# Patient Record
Sex: MALE | Race: WHITE | Employment: FULL TIME | URBAN - METROPOLITAN AREA
[De-identification: names, ages, dates, MRNs, and addresses within clinical notes are randomized per-mention and may not be internally consistent; named-entity substitution may affect disease eponyms.]

---

## 2017-01-26 PROBLEM — G89.29 CHRONIC LEFT-SIDED LOW BACK PAIN WITHOUT SCIATICA: Status: ACTIVE | Noted: 2017-01-26

## 2017-01-26 PROBLEM — M54.50 CHRONIC LEFT-SIDED LOW BACK PAIN WITHOUT SCIATICA: Status: ACTIVE | Noted: 2017-01-26

## 2017-01-26 PROCEDURE — 80048 BASIC METABOLIC PNL TOTAL CA: CPT | Performed by: INTERNAL MEDICINE

## 2017-01-26 PROCEDURE — 83540 ASSAY OF IRON: CPT | Performed by: INTERNAL MEDICINE

## 2017-01-26 PROCEDURE — 84443 ASSAY THYROID STIM HORMONE: CPT | Performed by: INTERNAL MEDICINE

## 2017-01-26 PROCEDURE — 82306 VITAMIN D 25 HYDROXY: CPT | Performed by: INTERNAL MEDICINE

## 2017-01-26 PROCEDURE — 83550 IRON BINDING TEST: CPT | Performed by: INTERNAL MEDICINE

## 2017-01-26 PROCEDURE — 83735 ASSAY OF MAGNESIUM: CPT | Performed by: INTERNAL MEDICINE

## 2017-01-26 PROCEDURE — 36415 COLL VENOUS BLD VENIPUNCTURE: CPT | Performed by: INTERNAL MEDICINE

## 2017-01-26 PROCEDURE — 82728 ASSAY OF FERRITIN: CPT | Performed by: INTERNAL MEDICINE

## 2017-01-26 PROCEDURE — 82607 VITAMIN B-12: CPT | Performed by: INTERNAL MEDICINE

## 2017-03-08 PROBLEM — Z86.711 HISTORY OF PULMONARY EMBOLISM: Status: ACTIVE | Noted: 2017-03-08

## 2017-04-27 PROBLEM — L30.9 ECZEMA OF BOTH HANDS: Status: ACTIVE | Noted: 2017-04-27

## 2017-12-19 ENCOUNTER — HOSPITAL ENCOUNTER (INPATIENT)
Facility: HOSPITAL | Age: 62
LOS: 2 days | Discharge: NURSING, REHAB OR LONG-TERM CARE | DRG: 065 | End: 2017-12-22
Attending: EMERGENCY MEDICINE | Admitting: INTERNAL MEDICINE
Payer: COMMERCIAL

## 2017-12-19 ENCOUNTER — APPOINTMENT (OUTPATIENT)
Dept: CT IMAGING | Facility: HOSPITAL | Age: 62
DRG: 065 | End: 2017-12-19
Payer: COMMERCIAL

## 2017-12-19 DIAGNOSIS — R53.1 WEAKNESS GENERALIZED: Primary | ICD-10-CM

## 2017-12-19 DIAGNOSIS — F41.9 ANXIETY: ICD-10-CM

## 2017-12-19 DIAGNOSIS — R53.1 RIGHT SIDED WEAKNESS: ICD-10-CM

## 2017-12-19 DIAGNOSIS — I48.0 PAF (PAROXYSMAL ATRIAL FIBRILLATION) (HCC): ICD-10-CM

## 2017-12-19 DIAGNOSIS — R42 DIZZINESS: ICD-10-CM

## 2017-12-19 PROCEDURE — 70450 CT HEAD/BRAIN W/O DYE: CPT | Performed by: EMERGENCY MEDICINE

## 2017-12-20 ENCOUNTER — APPOINTMENT (OUTPATIENT)
Dept: MRI IMAGING | Facility: HOSPITAL | Age: 62
DRG: 065 | End: 2017-12-20
Attending: INTERNAL MEDICINE
Payer: COMMERCIAL

## 2017-12-20 ENCOUNTER — APPOINTMENT (OUTPATIENT)
Dept: ULTRASOUND IMAGING | Facility: HOSPITAL | Age: 62
DRG: 065 | End: 2017-12-20
Attending: INTERNAL MEDICINE
Payer: COMMERCIAL

## 2017-12-20 ENCOUNTER — APPOINTMENT (OUTPATIENT)
Dept: CV DIAGNOSTICS | Facility: HOSPITAL | Age: 62
DRG: 065 | End: 2017-12-20
Attending: INTERNAL MEDICINE
Payer: COMMERCIAL

## 2017-12-20 PROBLEM — R53.1 WEAKNESS GENERALIZED: Status: ACTIVE | Noted: 2017-12-20

## 2017-12-20 PROBLEM — I48.0 PAF (PAROXYSMAL ATRIAL FIBRILLATION) (HCC): Status: ACTIVE | Noted: 2017-12-20

## 2017-12-20 PROBLEM — I48.0 PAF (PAROXYSMAL ATRIAL FIBRILLATION) (HCC): Status: RESOLVED | Noted: 2017-12-20 | Resolved: 2017-12-20

## 2017-12-20 PROBLEM — R42 DIZZINESS: Status: ACTIVE | Noted: 2017-12-20

## 2017-12-20 PROBLEM — F41.9 ANXIETY: Status: ACTIVE | Noted: 2017-12-20

## 2017-12-20 PROBLEM — R53.1 RIGHT SIDED WEAKNESS: Status: ACTIVE | Noted: 2017-12-20

## 2017-12-20 PROCEDURE — 93306 TTE W/DOPPLER COMPLETE: CPT | Performed by: INTERNAL MEDICINE

## 2017-12-20 PROCEDURE — 93880 EXTRACRANIAL BILAT STUDY: CPT | Performed by: INTERNAL MEDICINE

## 2017-12-20 PROCEDURE — 70544 MR ANGIOGRAPHY HEAD W/O DYE: CPT | Performed by: INTERNAL MEDICINE

## 2017-12-20 PROCEDURE — 70551 MRI BRAIN STEM W/O DYE: CPT | Performed by: INTERNAL MEDICINE

## 2017-12-20 PROCEDURE — 99255 IP/OBS CONSLTJ NEW/EST HI 80: CPT | Performed by: OTHER

## 2017-12-20 RX ORDER — DIPHENHYDRAMINE HYDROCHLORIDE 50 MG/ML
25 INJECTION INTRAMUSCULAR; INTRAVENOUS ONCE
Status: COMPLETED | OUTPATIENT
Start: 2017-12-20 | End: 2017-12-20

## 2017-12-20 RX ORDER — LOSARTAN POTASSIUM 100 MG/1
100 TABLET ORAL DAILY
Status: DISCONTINUED | OUTPATIENT
Start: 2017-12-20 | End: 2017-12-22

## 2017-12-20 RX ORDER — ATORVASTATIN CALCIUM 40 MG/1
80 TABLET, FILM COATED ORAL NIGHTLY
Status: DISCONTINUED | OUTPATIENT
Start: 2017-12-20 | End: 2017-12-22

## 2017-12-20 RX ORDER — ASPIRIN 325 MG
325 TABLET ORAL DAILY
Status: DISCONTINUED | OUTPATIENT
Start: 2017-12-20 | End: 2017-12-20

## 2017-12-20 RX ORDER — POTASSIUM CHLORIDE 20 MEQ/1
40 TABLET, EXTENDED RELEASE ORAL EVERY 4 HOURS
Status: COMPLETED | OUTPATIENT
Start: 2017-12-20 | End: 2017-12-20

## 2017-12-20 RX ORDER — OMEGA-3 FATTY ACIDS/FISH OIL 300-1000MG
600 CAPSULE ORAL EVERY 8 HOURS PRN
COMMUNITY
End: 2017-12-22

## 2017-12-20 RX ORDER — ATORVASTATIN CALCIUM 40 MG/1
80 TABLET, FILM COATED ORAL NIGHTLY
Status: DISCONTINUED | OUTPATIENT
Start: 2017-12-20 | End: 2017-12-20

## 2017-12-20 RX ORDER — CLOPIDOGREL BISULFATE 75 MG/1
75 TABLET ORAL DAILY
Status: DISCONTINUED | OUTPATIENT
Start: 2017-12-20 | End: 2017-12-22

## 2017-12-20 RX ORDER — TRAMADOL HYDROCHLORIDE 50 MG/1
50 TABLET ORAL EVERY 6 HOURS PRN
Status: DISCONTINUED | OUTPATIENT
Start: 2017-12-20 | End: 2017-12-22

## 2017-12-20 RX ORDER — ACETAMINOPHEN 325 MG/1
650 TABLET ORAL EVERY 6 HOURS PRN
Status: DISCONTINUED | OUTPATIENT
Start: 2017-12-20 | End: 2017-12-22

## 2017-12-20 RX ORDER — DEXTROSE MONOHYDRATE 25 G/50ML
50 INJECTION, SOLUTION INTRAVENOUS AS NEEDED
Status: DISCONTINUED | OUTPATIENT
Start: 2017-12-20 | End: 2017-12-22

## 2017-12-20 RX ORDER — LORAZEPAM 2 MG/ML
0.5 INJECTION INTRAMUSCULAR ONCE
Status: COMPLETED | OUTPATIENT
Start: 2017-12-20 | End: 2017-12-20

## 2017-12-20 RX ORDER — ONDANSETRON 2 MG/ML
4 INJECTION INTRAMUSCULAR; INTRAVENOUS EVERY 4 HOURS PRN
Status: DISCONTINUED | OUTPATIENT
Start: 2017-12-20 | End: 2017-12-22

## 2017-12-20 NOTE — SLP NOTE
ADULT SWALLOWING EVALUATION    ASSESSMENT    ASSESSMENT/OVERALL IMPRESSION:  Pt seen for clinical swallowing evaluation secondary of new diagnosis of CVA and right sided weakness. The pt was seen bedside while sitting upright in bed.   Right sided facial d swallowing precautions. Posted precautions at bedside and collaborated with RN. Per CORNELIUS [facility]OM[facility] functional communication measures, pt's swallow level is 5/7.          RECOMMENDATIONS   Diet Recommendations - Solids: Regular  Diet Recommendations - Liquid: [facility]) - on xaralto started 10/9/15 10/9/2015   • Other and unspecified hyperlipidemia    • Pulmonary embolism ([address]    • S/P hip replacement, left with surgical revision 11/3/2015   • S/P IVC filter - 9/25/15 10/9/2015   • Status post total hip replacement Impaired    Pharyngeal Phase of Swallow: Impaired  Laryngeal Elevation Timing: Appears impaired  Laryngeal Elevation Strength: Appears intact  Laryngeal Elevation Coordination: Appears intact  (Please note: Silent aspiration cannot be evaluated clinically.

## 2017-12-20 NOTE — ED INITIAL ASSESSMENT (HPI)
Pt brought to ER by wife with c/o dizziness, headache, speech changes, right arm weakness, and difficulty ambulating since 2030 tonight. Pt states he was recently dx with \"bulging discs\" per MRI. Pt is alert and oriented x4.  Face symmetrical. Tongue midl

## 2017-12-20 NOTE — ED NOTES
Patient is safe to transport to floor per MD. Report given to floor RN, Severo Baldy. Transport via cart requested. Remote cardiac telemetry verified in progress.

## 2017-12-20 NOTE — PROGRESS NOTES
Dr. Lu Atkinson notified of change in patients neuro exam.  Patients speech is slurred, he a a right facial droop, his right arm and right leg are weaker than the left. He denies numbness or tingling, he does state he is dizzy.  He states he cannot control his ri

## 2017-12-20 NOTE — PLAN OF CARE
Problem: Diabetes/Glucose Control  Goal: Glucose maintained within prescribed range  INTERVENTIONS:  - Monitor Blood Glucose as ordered  - Assess for signs and symptoms of hyperglycemia and hypoglycemia  - Administer ordered medications to maintain glucose level and limitations with patient/family   Outcome: Progressing      Comments: Pt appeared drowsy upon arrival on unit. Received benadryl and ativan in the ER. Given PRN tramadol for generalized pain. Given potassium supplement per electrolyte protocol.  Trinity Holden

## 2017-12-20 NOTE — SLP NOTE
SPEECH/LANGUAGE/COGNITIVE EVALUATION - INPATIENT    Admission Date: 12/19/2017  Evaluation Date: 12/20/17    Reason for Referral: Stroke protocol;R/O aspiration    ASSESSMENT & PLAN   ASSESSMENT & IMPRESSION  Pt seen for speech language evaluation secondar trained in speech compensatory strategies of slow rate, open mouth, over articulate, and increase loudness. The pt will be able to recall speech strategies with 90% accuracy within 5 sessions.    Goal #3 Pt will completed verbal agility exercises with mild

## 2017-12-20 NOTE — ED PROVIDER NOTES
Patient Seen in: [facility] AND [facility] Emergency Department    History   Patient presents with:  Dizziness (neurologic)      HPI    The patient presents to the ED with his wife complaining of dizziness that he describes as lightheadedness, headache, mild slu Left  9/29/15: REVISE TOTAL HIP REPLACEMENT Left      Comment: Dr Casey Jones  9/1/15: TOTAL HIP REPLACEMENT Left      Comment: Dr Casey Jones      Medications :    (Not in a hospital admission)     Family History   Problem Relation Age of Onset   • Heart Disease Ot has normal strength. No cranial nerve deficit or sensory deficit. Coordination normal. GCS eye subscore is 4. GCS verbal subscore is 5. GCS motor subscore is 6. Skin: Skin is warm and dry. Nursing note and vitals reviewed.       ED Course        Labs Re EKG    Rate, intervals and axes as noted on EKG Report.   Rate: Normal  Rhythm: Sinus Rhythm  Reading: Normal intervals, normal EKG             Imaging Results Available and Reviewed while in ED:    Preliminary Radiology Report  [facility]); SVT (supraventricular tachycardia) ([facility] S/P IVC filter - 9/25/15; Essential hypertension; Status post total hip replacement, left; S/P hip replacement, left with surgical revision; Mixed hyperlipidemia; CAD in native artery; Status post revision o Weakness generalized R53.1 12/20/2017 Unknown

## 2017-12-20 NOTE — PROGRESS NOTES
Attempted to see patient. He is out of room for test.  Will evaluate the patient later today. Thank you for consult.

## 2017-12-20 NOTE — PROGRESS NOTES
Consult received by Dr. Aminta Kocher on this patient with left pontine infarct with sequelae including aphasia, slurred speech , right hemiparesis. Workup ongoing, awaiting MRA brain. Will see once PT and OT have evaluated patient to determined rehab goals.

## 2017-12-20 NOTE — H&P
[address] Patient Status:  Observation    1955 MRN [id number]   Location [facility]5W Attending Kwesi Guerrero MD   Hosp Day # 0 PCP Hadley Raya MD     Date:  2017 pulmonale ([facility] - on xaralto started 10/9/15 10/9/2015   • Other and unspecified hyperlipidemia    • Pulmonary embolism (HCC)    • S/P hip replacement, left with surgical revision 11/3/2015   • S/P IVC filter - 9/25/15 10/9/2015   • Status post total hip r pain, sore throat, hearing loss    RESPIRATORY: Denies shortness of breath, THORNTON, wheezing or cough   CARDIOVASCULAR: Denies chest pain, palpitations    GI: Denies nausea, vomiting, constipation, diarrhea, hematochezia, dysphagia, heartburn    Denies dysu 07/17/2015   TSH 0.954 01/26/2017   PSA 0.292 10/17/2016   MG 2.2 01/26/2017   TROP 0.01 12/20/2017   CK 63 09/08/2017   B12 535 01/26/2017       Ct Stroke Brain (no Iv)(cpt=70450)    Result Date: 12/20/2017  CONCLUSION:  1.  No intracranial hemorrhage, foc

## 2017-12-20 NOTE — CONSULTS
[facility]    PATIENT'S NAME: Royer Julian   ATTENDING PHYSICIAN: Socorro Scott MD   CONSULTING PHYSICIAN: Bere Ziegler MD   PATIENT ACCOUNT#:   [id number]    LOCATION:  3WSW [address] #:   [id number]       DATE OF BI 96%.  NEUROLOGIC:  He is dysarthric. Right-sided facial weakness. Moderate right hemiparesis. Very clumsy in the right hand. Marked right finger-to-nose, right heel-to-shin dysmetric.   Strength in left arm and left leg normal.  Pupils reactive to light

## 2017-12-21 PROBLEM — I63.9 BRAIN STEM INFARCTION (HCC): Status: ACTIVE | Noted: 2017-12-21

## 2017-12-21 PROCEDURE — 99233 SBSQ HOSP IP/OBS HIGH 50: CPT | Performed by: OTHER

## 2017-12-21 RX ORDER — HYDROCHLOROTHIAZIDE 12.5 MG/1
12.5 CAPSULE, GELATIN COATED ORAL DAILY
Status: DISCONTINUED | OUTPATIENT
Start: 2017-12-21 | End: 2017-12-22

## 2017-12-21 RX ORDER — ENOXAPARIN SODIUM 100 MG/ML
40 INJECTION SUBCUTANEOUS DAILY
Status: DISCONTINUED | OUTPATIENT
Start: 2017-12-21 | End: 2017-12-22

## 2017-12-21 RX ORDER — METOPROLOL TARTRATE 50 MG/1
50 TABLET, FILM COATED ORAL
Status: DISCONTINUED | OUTPATIENT
Start: 2017-12-21 | End: 2017-12-22

## 2017-12-21 NOTE — PROGRESS NOTES
[facility]    Progress Note    Ketan Donatos Patient Status:  Observation    1955 MRN [id number]   Location [facility] 3W/SW Attending Eric Reyna MD   Hosp Day # 0 PCP Edmar Cook MD     Subjective: artery. There is suspected to retrograde flow at the distal aspect of the left vertebral artery via the right V4 segment. 2. Minimal flow is suggested in the left posterior inferior cerebellar artery, suspected to be retrograde.   3. 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

## 2017-12-21 NOTE — SLP NOTE
SPEECH DAILY NOTE - INPATIENT    ASSESSMENT & PLAN   ASSESSMENT    Pt seen for training of oral motor exam exercises, training of speech compensatory strategies and verbal agility drills. Demonstration and discussion provided to Pt and spouse.  Handout give diadochokinetic drills with 75% accuracy with reduced articulation noted with increased rate for plosive, sibilant and fricative production.      GOAL PROGRESSING     Goal #4 The patient will use speech compensatory strategies at the sentence and conversati

## 2017-12-21 NOTE — SLP NOTE
SPEECH DAILY NOTE - INPATIENT    ASSESSMENT & PLAN   ASSESSM ENT    Pt seen upright in chair with current diet of solid/thin liquids for monitoring of diet tolerance. Swallowing precautions/strategies discussed and demonstrated with Pt and spouse.  Mild cue no throat clearing and clear vocal quality). Pt with throat clear/cough on two of several trials of thin liquid via open cup. Rec downgrade to nectar-thick liquids until VFSS can be completed.        GOAL NOT MET     Goal #2 The patient/family/caregiver adeola

## 2017-12-21 NOTE — OCCUPATIONAL THERAPY NOTE
OCCUPATIONAL THERAPY EVALUATION - INPATIENT     Room Number: 348/348-A  Evaluation Date: 12/21/2017  Type of Evaluation: Initial  Presenting Problem:  (RT sided weakness)    Physician Order: IP Consult to Occupational Therapy  Reason for Therapy: ADL/IADL Medical History  Past Medical History:   Diagnosis Date   • Acute DVT (deep venous thrombosis), unspecified laterality 10/9/2015   • ASHD (arteriosclerotic heart disease) s/p plasty and stent 11/3/2015   • Coronary artery disease involving native coronary weekend.      SUBJECTIVE  \"I want to walk\"      OCCUPATIONAL THERAPY EXAMINATION      OBJECTIVE  Precautions:  (RT side weakness)  Fall Risk: High fall risk    PAIN ASSESSMENT  Rating:  (chronic )  Location:  (low back)  Management Techniques: Repositioni encourage pt to use RT UE for seated, simple tasks, discussed benefits of distraction free environment  Patient End of Session: Up in chair;Call light within reach      OT Goals    Patients self stated goal is: return to PLOF, to walk better     Patient wi

## 2017-12-21 NOTE — CM/SW NOTE
12/21-MD Orders received in regards to discharge planning. This Writer spoke with the Patient's wife Lyric Donald ([phone]) in regards to discharge planning. The Patient resides with wife in [facility] in  A single family home.  Prior to hospitalization, th

## 2017-12-21 NOTE — PHYSICAL THERAPY NOTE
PHYSICAL THERAPY EVALUATION - INPATIENT     Room Number: 348/348-A  Evaluation Date: 12/21/2017  Type of Evaluation: Initial  Physician Order: PT Eval and Treat    Presenting Problem: Right sided weakness, +moderate acute brain stem infarct  Reason for pulmonary embolism    Right sided weakness      Past Medical History  Past Medical History:   Diagnosis Date   • Acute DVT (deep venous thrombosis), unspecified laterality 10/9/2015   • ASHD (arteriosclerotic heart disease) s/p plasty and stent 11/3/2015 just feel weak and woozy\"     PHYSICAL THERAPY EXAMINATION     OBJECTIVE  Precautions: Limb alert - right  Fall Risk: High fall risk    WEIGHT BEARING RESTRICTION  Weight Bearing Restriction: None                PAIN ASSESSMENT  Ratin          COGNITI Degree of Impairment Score: 46.58%   Standardized Score (AM-PAC Scale): 43.63   CMS Modifier (G-Code): CK    FUNCTIONAL ABILITY STATUS  Gait Assessment   Gait Assistance: Minimum assistance; Moderate assistance  Distance (ft): 17 x 2  Assistive Device: Roll

## 2017-12-21 NOTE — CONSULTS
Per CM/SW team, PM&R consult has been cancelled. Would be happy to consult should patient and family change their minds about discharge disposition.      Samuel Ortiz [facility]

## 2017-12-22 ENCOUNTER — APPOINTMENT (OUTPATIENT)
Dept: GENERAL RADIOLOGY | Facility: HOSPITAL | Age: 62
DRG: 065 | End: 2017-12-22
Attending: INTERNAL MEDICINE
Payer: COMMERCIAL

## 2017-12-22 VITALS
BODY MASS INDEX: 34.35 KG/M2 | RESPIRATION RATE: 18 BRPM | OXYGEN SATURATION: 95 % | HEART RATE: 67 BPM | DIASTOLIC BLOOD PRESSURE: 70 MMHG | TEMPERATURE: 98 F | HEIGHT: 68 IN | SYSTOLIC BLOOD PRESSURE: 146 MMHG | WEIGHT: 226.63 LBS

## 2017-12-22 PROCEDURE — 74230 X-RAY XM SWLNG FUNCJ C+: CPT | Performed by: INTERNAL MEDICINE

## 2017-12-22 RX ORDER — ALPRAZOLAM 0.5 MG/1
0.5 TABLET ORAL 3 TIMES DAILY PRN
Status: DISCONTINUED | OUTPATIENT
Start: 2017-12-22 | End: 2017-12-22

## 2017-12-22 RX ORDER — ESCITALOPRAM OXALATE 10 MG/1
10 TABLET ORAL EVERY MORNING
Status: DISCONTINUED | OUTPATIENT
Start: 2017-12-22 | End: 2017-12-22

## 2017-12-22 RX ORDER — ATORVASTATIN CALCIUM 80 MG/1
80 TABLET, FILM COATED ORAL NIGHTLY
Qty: 30 TABLET | Refills: 3 | Status: SHIPPED | OUTPATIENT
Start: 2017-12-22 | End: 2018-01-09

## 2017-12-22 RX ORDER — ESCITALOPRAM OXALATE 10 MG/1
10 TABLET ORAL EVERY MORNING
Qty: 30 TABLET | Refills: 3 | Status: SHIPPED | OUTPATIENT
Start: 2017-12-23 | End: 2018-01-09

## 2017-12-22 RX ORDER — CLOPIDOGREL BISULFATE 75 MG/1
75 TABLET ORAL DAILY
Qty: 30 TABLET | Refills: 3 | Status: SHIPPED | OUTPATIENT
Start: 2017-12-23 | End: 2018-02-16

## 2017-12-22 RX ORDER — TRAMADOL HYDROCHLORIDE 50 MG/1
100 TABLET ORAL EVERY 8 HOURS PRN
Qty: 50 TABLET | Refills: 0 | Status: SHIPPED | OUTPATIENT
Start: 2017-12-22 | End: 2018-01-09

## 2017-12-22 RX ORDER — ALPRAZOLAM 0.5 MG/1
0.5 TABLET ORAL 3 TIMES DAILY PRN
Qty: 30 TABLET | Refills: 0 | Status: SHIPPED | OUTPATIENT
Start: 2017-12-22 | End: 2018-01-05

## 2017-12-22 NOTE — PLAN OF CARE
Diabetes/Glucose Control    • Glucose maintained within prescribed range Adequate for Discharge        METABOLIC/FLUID AND ELECTROLYTES - ADULT    • Electrolytes maintained within normal limits Adequate for Discharge        MUSCULOSKELETAL - ADULT    • Ret

## 2017-12-22 NOTE — CM/SW NOTE
12/22/17 CM Discharge planning   Met with pt and wife at bedside, both agreed to rehab at [facility]. Spoke with Bryant Soulier at [facility], bed available today at 4:00 pm, RN report [phone], transport arranged via [facility].   Pt and family lori

## 2017-12-22 NOTE — OCCUPATIONAL THERAPY NOTE
OCCUPATIONAL THERAPY TREATMENT NOTE - INPATIENT     Room Number: 348/348-A          Presenting Problem: cva    Problem List  Principal Problem:    Brain stem infarction [facility])  Active Problems:    Type 2 diabetes mellitus without complication ([facility]    [place] RECOMMENDATIONS  OT Discharge Recommendations: Acute rehabilitation  OT Device Recommendations: TBD      PLAN  OT Treatment Plan: Balance activities; ADL training;Functional transfer training;UE strengthening/ROM; Endurance training;Patient/Family education; Dressing:  Mod A to don socks, Min A to doff socks seated    Education Provided: role of OT, RUE strengthening and coordination exercises, functional transfers  Patient End of Session: Up in chair;Needs met;Call light within reach;RN aware of session/findin

## 2017-12-22 NOTE — PAYOR COMM NOTE
--------------  ADMISSION REVIEW     Payor: Britta Tony SIMON PPO  Subscriber #:  [id number]  Authorization Number: [id number]    Admit date: 12/20/17  Admit time: 1734       Admitting Physician: Becky Bhatia MD  Attending Physician:  Kyle Dodson • Mixed hyperlipidemia 10/13/2015   • Musculoskeletal strain - left scapular 9/2/2014   • Musculoskeletal strain - left scapular 9/2/2014   • Other acute pulmonary embolism with acute cor pulmonale ([facility] - on xaralto started 10/9/15 10/9/2015   • Other and Constitutional: He is oriented to person, place, and time. He appears well-developed and well-nourished. No distress. HENT:   Head: Normocephalic and atraumatic. Mouth/Throat: Oropharynx is clear and moist. No oropharyngeal exudate.    Eyes: Conjunctiva Please view results for these tests on the individual orders. TYPE AND SCREEN    Narrative: The following orders were created for panel order TYPE AND SCREEN.   Procedure                               Abnormality         Status                     --- 12/20/17  0045 12/20/17  0100 12/20/17  0230 12/20/17  0245   BP: 139/61 136/73  128/75   Pulse: 69 65 62 59   Resp: 23 21 22 22   Temp:       TempSrc:       SpO2: 97% 97% 94% 96%   Weight:       Height:         *I personally reviewed and interpreted all ED Course: Patient presents with multiple vague symptoms for the past 3 hours. No distress on ED arrival, neurologically intact. Stroke alert called from triage, CT returned unremarkable. Laboratory testing returned with no significant abnormalities.   1 Related Notes:  Original Note by Alex Suarez MD (Physician) filed at 2017  8:59 AM         [facility]    History & Physical[CB. [address][CB. 2] Patient Status:  Observation    1955 MRN [id number]   Joe Puckett • Mixed hyperlipidemia 10/13/2015   • Musculoskeletal strain - left scapular 9/2/2014   • Musculoskeletal strain - left scapular 9/2/2014   • Other acute pulmonary embolism with acute cor pulmonale ([facility] - on xaralto started 10/9/15 10/9/2015   • Other and aspirin 81 MG Oral Tab 81 mg = 1 tab, Oral, Daily, Tab DR, Maintenance[CB. 2]       Review of Systems:     GENERAL HEALTH: feels well otherwise    SKIN: Denies any unusual skin lesions or rashes   HEENT: Denies visual complaints or deficits; denies nasal co BUN 23 (H) 12/19/2017    12/19/2017   K 3.6 12/19/2017    12/19/2017   CO2 25 12/19/2017    (H) 12/19/2017   CA 8.6 12/19/2017   ALB 4.0 09/08/2017   ALKPHO 73 09/08/2017   BILT 0.72 09/08/2017   TP 7.1 11/03/2017   AST 16 11/03/2017   A 1. MODERATE ACUTE BRAIN STEM INFARCT involves left paramedian scott measures 19 x 10 mm. No significant mass effect.  Scattered punctate foci of increased T2 and FLAIR signal 28 triple subcortical white matter consistent with chronic white matter   microvasc 12/21/2017 1331 Given 1 Units Subcutaneous (Left Upper Arm) Eleazar Manzo RN      losartan (COZAAR) tab 100 mg     Date Action Dose Route User    12/22/2017 1022 Given 100 mg Oral Aric Dewey RN      Metoprolol Tartrate (LOPRESSOR) tab 50 mg     Da PAST MEDICAL HISTORY:  Hypertension, elevated cholesterol, past history of PE and was on Xarelto for a short period of time, coronary artery disease status post stent, diabetes.     PAST SURGICAL HISTORY:  Left total hip replacement, IVC filter, hernia claudine 2.       Change aspirin to Plavix  3.       Rehab consultation, and I have called Otto. 4.       Goal of LDL is 70 or less. 5.       Stroke education. 6.       Stroke order sets.   7.       I believe he will need an acute rehab.  8.       I discus SINUSES: Limited views demonstrate no significant mucosal thickening or fluid.    ORBITS: Limited views are unremarkable.    OTHER: Signal voids within the major intracranial vessels are grossly maintained.   Clinical service was notified at the time of thi Constitutional: He is oriented to person, place, and time. HENT:   Right facial present   Cardiovascular: Normal rate and regular rhythm. Edema not present. Pulmonary/Chest: Effort normal and breath sounds normal. No respiratory distress.  He has no CONCLUSION:  1. MODERATE ACUTE BRAIN STEM INFARCT involves left paramedian scott measures 19 x 10 mm. No significant mass effect.  Scattered punctate foci of increased T2 and FLAIR signal 28 triple subcortical white matter consistent with chronic white matte Chronic left-sided low back pain without sciatica    History of pulmonary embolism    Right sided weakness     Medically stable. Continue with current Plavix.   Needs continued PT/OT  Social Sx to look into possible rehab.     BP is controlled.     Metfo * No resolved hospital problems. *        - speech improved today  - pt depressed. Start lexapro 10 mg daily. Xanax for anxiety  - discussed side effects. Pt agreed to plan  - plan to dc to acute rehab.  Waiting for approval for Nada     Dispo: pt ok CONCLUSION:  1. Bilateral carotid bifurcation/proximal ICA plaque without hemodynamic significance.  2. Antegrade flow within both vertebral arteries.        Ekg 12-lead     Result Date: 12/20/2017  ECG Report  Interpretation  -------------------------- Sin

## 2017-12-22 NOTE — PROGRESS NOTES
[facility]    Progress Note    Jeanmarie Richmond Patient Status:  Inpatient    1955 MRN [id number]   Location [facility] 3W/SW Attending Nikkie Chatman MD   [facility] Day # 1 PCP Bijal Cuevas MD       Subjective:   Amelia Arn lesions or rashes  EYES: no visual complaints or deficits  HEENT: denies nasal congestion, sinus pain or sore throat; hearing loss negative  RESPIRATORY: denies shortness of breath, wheezing or cough   CARDIOVASCULAR: denies chest pain or THORNTON; no palpitati ischemia 2. No intracranial hemorrhage, mass effect or midline shift. No abnormal extra axial fluid 3. No ventriculomegaly        Us Carotid Doppler Bilat - Diag Img (cpt=93880)    Result Date: 12/20/2017  CONCLUSION:  1.  Bilateral carotid bifurcation/prox

## 2017-12-22 NOTE — SLP NOTE
ADULT VIDEOFLUOROSCOPIC SWALLOWING STUDY    Admission Date: 12/19/2017  Evaluation Date: 12/22/17  Radiologist: Dr. Sukhwinder Juarez: Regular  Diet Recommendations - Liquid:  Thin WITH CHIN TUCK AND SMALL SIPS    Fur replacement, left with surgical revision 11/3/2015   • S/P IVC filter - 9/25/15 10/9/2015   • Status post total hip replacement, left 10/13/2015   • Thalassemia minor 9/2/2014   • Thalassemia minor 9/2/2014   • Type 2 diabetes mellitus without complication dysphagia with laryngeal penetration of thin liquids, which was deeper and of greater amount with drinks from straw than cup. A chin tuck helped to reduce the penetration, although not with straw.   Recommend general diet with thin liquids with small, sing Pathologist  Crow. [id number]

## 2017-12-22 NOTE — PROGRESS NOTES
[place] [facility] - [facility]    Progress Note    Victoriano Wong Patient Status:  Inpatient    1955 MRN [id number]   Location [facility] 3W/SW Attending Wayne Pal MD   [facility] Day # 2 PCP Keren Marshall MD       Subjective:     Pt 63 09/08/2017   B12 535 01/26/2017       Mra Brain (wcd=[id number])    Result Date: 12/20/2017  CONCLUSION:  1. 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  There is suspected to retrograde flow at th

## 2017-12-23 NOTE — DISCHARGE SUMMARY
[place] [facility] - [facility]    Discharge Summary    Cathie Smith Patient Status:  Inpatient    1955 MRN [id number]   Location [facility] 3W/SW Attending No att. providers found   [facility] Day # 2 PCP Jatin Lorenz MD     Date of Admissi MRA showed occlusion of the left vertebral artery but the carotid arteries were clear. PT and OT were consulted and recommended acute rehab.  Speech therapy was also consulted and recommended regular solid food with thin liquids following a video swallow st (1,000 mg total) by mouth daily with breakfast., Script not printed, Disp-180 tablet, R-3    losartan 100 MG Oral Tab  Take 1 tablet (100 mg total) by mouth daily. , Normal, Disp-90 tablet, R-3    hydrochlorothiazide 12.5 MG Oral Cap  Take 1 capsule (12.5 m

## 2018-01-08 PROBLEM — R42 DIZZINESS: Status: RESOLVED | Noted: 2017-12-20 | Resolved: 2018-01-08

## 2018-01-09 PROBLEM — R53.1 WEAKNESS GENERALIZED: Status: RESOLVED | Noted: 2017-12-20 | Resolved: 2018-01-09

## 2018-01-09 PROCEDURE — 82607 VITAMIN B-12: CPT | Performed by: INTERNAL MEDICINE

## 2018-01-19 NOTE — PAYOR COMM NOTE
--------------  DISCHARGE REVIEW    Payor: Adrianatemo [employer]eman [employer] PPO  Subscriber #:  [id number]  Authorization Number: [id number]    Admit date: 12/20/17  Admit time:  4683  Discharge Date: 12/22/2017  4:51 PM     Admitting Physician: Eric Reyna MD neurologically intact. He was admitted for observation. On the morning of 12/20, he was noted to have right side facial droop, right tongue deviation, and right arm weakness. Stat MRI was ordered and showed an acute brain stem stroke.     Neurology was cons 500 MG Oral Tablet 24 Hr  Take 2 tablets (1,000 mg total) by mouth daily with breakfast.  losartan 100 MG Oral Tab  Take 1 tablet (100 mg total) by mouth daily.   hydrochlorothiazide 12.5 MG Oral Cap  Metoprolol Tartrate 50 MG Oral Tab  Take 1 tablet (50 mg

## 2018-02-19 PROBLEM — I63.9 STROKE (HCC): Status: ACTIVE | Noted: 2018-02-19

## 2018-02-27 PROBLEM — G47.33 OBSTRUCTIVE SLEEP APNEA (ADULT) (PEDIATRIC): Status: ACTIVE | Noted: 2018-02-27

## 2018-04-09 PROCEDURE — 81001 URINALYSIS AUTO W/SCOPE: CPT | Performed by: INTERNAL MEDICINE

## 2018-06-04 PROBLEM — R53.1 RIGHT SIDED WEAKNESS: Status: RESOLVED | Noted: 2017-12-20 | Resolved: 2018-06-04

## 2018-07-27 PROBLEM — M47.817 LUMBOSACRAL SPONDYLOSIS WITHOUT MYELOPATHY: Status: ACTIVE | Noted: 2018-07-27

## 2021-06-30 PROBLEM — E66.01 CLASS 2 SEVERE OBESITY DUE TO EXCESS CALORIES WITH SERIOUS COMORBIDITY AND BODY MASS INDEX (BMI) OF 36.0 TO 36.9 IN ADULT (HCC): Status: ACTIVE | Noted: 2021-06-30

## 2021-06-30 PROBLEM — E66.01 CLASS 2 SEVERE OBESITY DUE TO EXCESS CALORIES WITH SERIOUS COMORBIDITY AND BODY MASS INDEX (BMI) OF 36.0 TO 36.9 IN ADULT  (HCC): Status: ACTIVE | Noted: 2021-06-30

## 2021-06-30 PROBLEM — E66.01 CLASS 2 SEVERE OBESITY DUE TO EXCESS CALORIES WITH SERIOUS COMORBIDITY AND BODY MASS INDEX (BMI) OF 36.0 TO 36.9 IN ADULT: Status: ACTIVE | Noted: 2021-06-30

## 2021-12-03 PROBLEM — R35.1 BENIGN PROSTATIC HYPERPLASIA WITH NOCTURIA: Status: ACTIVE | Noted: 2021-12-03

## 2021-12-03 PROBLEM — N40.1 BENIGN PROSTATIC HYPERPLASIA WITH NOCTURIA: Status: ACTIVE | Noted: 2021-12-03

## 2021-12-03 PROBLEM — G89.29 CHRONIC LEFT-SIDED LOW BACK PAIN WITHOUT SCIATICA: Status: RESOLVED | Noted: 2017-01-26 | Resolved: 2021-12-03

## 2021-12-03 PROBLEM — F41.9 ANXIETY: Status: RESOLVED | Noted: 2017-12-20 | Resolved: 2021-12-03

## 2021-12-03 PROBLEM — E66.01 CLASS 2 SEVERE OBESITY DUE TO EXCESS CALORIES WITH SERIOUS COMORBIDITY AND BODY MASS INDEX (BMI) OF 36.0 TO 36.9 IN ADULT (HCC): Status: RESOLVED | Noted: 2021-06-30 | Resolved: 2021-12-03

## 2021-12-03 PROBLEM — E66.01 CLASS 2 SEVERE OBESITY DUE TO EXCESS CALORIES WITH SERIOUS COMORBIDITY AND BODY MASS INDEX (BMI) OF 36.0 TO 36.9 IN ADULT  (HCC): Status: RESOLVED | Noted: 2021-06-30 | Resolved: 2021-12-03

## 2021-12-03 PROBLEM — L30.9 ECZEMA OF BOTH HANDS: Status: RESOLVED | Noted: 2017-04-27 | Resolved: 2021-12-03

## 2021-12-03 PROBLEM — I63.9 BRAIN STEM INFARCTION (HCC): Status: RESOLVED | Noted: 2017-12-21 | Resolved: 2021-12-03

## 2021-12-03 PROBLEM — M54.50 CHRONIC LEFT-SIDED LOW BACK PAIN WITHOUT SCIATICA: Status: RESOLVED | Noted: 2017-01-26 | Resolved: 2021-12-03

## 2021-12-03 PROBLEM — I63.9 STROKE (HCC): Status: RESOLVED | Noted: 2018-02-19 | Resolved: 2021-12-03

## 2021-12-03 PROBLEM — E66.01 CLASS 2 SEVERE OBESITY DUE TO EXCESS CALORIES WITH SERIOUS COMORBIDITY AND BODY MASS INDEX (BMI) OF 36.0 TO 36.9 IN ADULT: Status: RESOLVED | Noted: 2021-06-30 | Resolved: 2021-12-03

## 2021-12-03 PROBLEM — Z86.73 HISTORY OF STROKE: Status: ACTIVE | Noted: 2021-12-03

## 2021-12-03 PROBLEM — M47.817 LUMBOSACRAL SPONDYLOSIS WITHOUT MYELOPATHY: Status: RESOLVED | Noted: 2018-07-27 | Resolved: 2021-12-03

## 2022-12-18 ENCOUNTER — HOSPITAL ENCOUNTER (EMERGENCY)
Facility: HOSPITAL | Age: 67
Discharge: HOME | End: 2022-12-18
Attending: EMERGENCY MEDICINE
Payer: COMMERCIAL

## 2022-12-18 ENCOUNTER — APPOINTMENT (OUTPATIENT)
Dept: CT IMAGING | Facility: HOSPITAL | Age: 67
End: 2022-12-18
Attending: EMERGENCY MEDICINE
Payer: COMMERCIAL

## 2022-12-18 VITALS
RESPIRATION RATE: 17 BRPM | DIASTOLIC BLOOD PRESSURE: 88 MMHG | HEART RATE: 71 BPM | OXYGEN SATURATION: 97 % | TEMPERATURE: 99 F | SYSTOLIC BLOOD PRESSURE: 181 MMHG

## 2022-12-18 DIAGNOSIS — G45.9 TIA DUE TO EMBOLISM (HCC): ICD-10-CM

## 2022-12-18 DIAGNOSIS — I74.9 TIA DUE TO EMBOLISM (HCC): ICD-10-CM

## 2022-12-18 DIAGNOSIS — G45.9 TIA (TRANSIENT ISCHEMIC ATTACK): Primary | ICD-10-CM

## 2022-12-18 LAB
ALBUMIN SERPL-MCNC: 3.6 G/DL (ref 3.4–5)
ALBUMIN/GLOB SERPL: 1 {RATIO} (ref 1–2)
ALP LIVER SERPL-CCNC: 64 U/L
ALT SERPL-CCNC: 33 U/L
ANION GAP SERPL CALC-SCNC: 5 MMOL/L (ref 0–18)
AST SERPL-CCNC: 28 U/L (ref 15–37)
BASOPHILS # BLD AUTO: 0.04 X10(3) UL (ref 0–0.2)
BASOPHILS NFR BLD AUTO: 0.6 %
BILIRUB SERPL-MCNC: 0.4 MG/DL (ref 0.1–2)
BUN BLD-MCNC: 17 MG/DL (ref 7–18)
CALCIUM BLD-MCNC: 9.2 MG/DL (ref 8.5–10.1)
CHLORIDE SERPL-SCNC: 108 MMOL/L (ref 98–112)
CO2 SERPL-SCNC: 28 MMOL/L (ref 21–32)
CREAT BLD-MCNC: 1.05 MG/DL
EOSINOPHIL # BLD AUTO: 0.21 X10(3) UL (ref 0–0.7)
EOSINOPHIL NFR BLD AUTO: 3.3 %
ERYTHROCYTE [DISTWIDTH] IN BLOOD BY AUTOMATED COUNT: 16.3 %
GFR SERPLBLD BASED ON 1.73 SQ M-ARVRAT: 78 ML/MIN/1.73M2 (ref 60–?)
GLOBULIN PLAS-MCNC: 3.7 G/DL (ref 2.8–4.4)
GLUCOSE BLD-MCNC: 248 MG/DL (ref 70–99)
HCT VFR BLD AUTO: 40.5 %
HGB BLD-MCNC: 12.8 G/DL
IMM GRANULOCYTES # BLD AUTO: 0.02 X10(3) UL (ref 0–1)
IMM GRANULOCYTES NFR BLD: 0.3 %
LYMPHOCYTES # BLD AUTO: 1.9 X10(3) UL (ref 1–4)
LYMPHOCYTES NFR BLD AUTO: 30 %
MCH RBC QN AUTO: 21.6 PG (ref 26–34)
MCHC RBC AUTO-ENTMCNC: 31.6 G/DL (ref 31–37)
MCV RBC AUTO: 68.3 FL
MONOCYTES # BLD AUTO: 0.45 X10(3) UL (ref 0.1–1)
MONOCYTES NFR BLD AUTO: 7.1 %
NEUTROPHILS # BLD AUTO: 3.72 X10 (3) UL (ref 1.5–7.7)
NEUTROPHILS # BLD AUTO: 3.72 X10(3) UL (ref 1.5–7.7)
NEUTROPHILS NFR BLD AUTO: 58.7 %
OSMOLALITY SERPL CALC.SUM OF ELEC: 302 MOSM/KG (ref 275–295)
PLATELET # BLD AUTO: 163 10(3)UL (ref 150–450)
POTASSIUM SERPL-SCNC: 3.9 MMOL/L (ref 3.5–5.1)
PROT SERPL-MCNC: 7.3 G/DL (ref 6.4–8.2)
RBC # BLD AUTO: 5.93 X10(6)UL
SARS-COV-2 RNA RESP QL NAA+PROBE: NOT DETECTED
SODIUM SERPL-SCNC: 141 MMOL/L (ref 136–145)
WBC # BLD AUTO: 6.3 X10(3) UL (ref 4–11)

## 2022-12-18 PROCEDURE — 85025 COMPLETE CBC W/AUTO DIFF WBC: CPT | Performed by: EMERGENCY MEDICINE

## 2022-12-18 PROCEDURE — 99284 EMERGENCY DEPT VISIT MOD MDM: CPT | Performed by: EMERGENCY MEDICINE

## 2022-12-18 PROCEDURE — 96374 THER/PROPH/DIAG INJ IV PUSH: CPT | Performed by: EMERGENCY MEDICINE

## 2022-12-18 PROCEDURE — 70498 CT ANGIOGRAPHY NECK: CPT | Performed by: EMERGENCY MEDICINE

## 2022-12-18 PROCEDURE — 80053 COMPREHEN METABOLIC PANEL: CPT | Performed by: EMERGENCY MEDICINE

## 2022-12-18 PROCEDURE — 70496 CT ANGIOGRAPHY HEAD: CPT | Performed by: EMERGENCY MEDICINE

## 2022-12-18 PROCEDURE — 93005 ELECTROCARDIOGRAM TRACING: CPT

## 2022-12-18 PROCEDURE — 93010 ELECTROCARDIOGRAM REPORT: CPT | Performed by: EMERGENCY MEDICINE

## 2022-12-18 RX ORDER — HYDRALAZINE HYDROCHLORIDE 20 MG/ML
5 INJECTION INTRAMUSCULAR; INTRAVENOUS ONCE
Status: COMPLETED | OUTPATIENT
Start: 2022-12-18 | End: 2022-12-18

## 2022-12-18 NOTE — ED INITIAL ASSESSMENT (HPI)
Per wife, last night at 10pm pt was not making sense, wife says he knew he was not making sense but could not get the words out correctly. Pt states today he had a headache and his vision seems hazy.  Pt had an acute brainstem stroke 12/20/2017

## 2022-12-19 ENCOUNTER — TELEPHONE (OUTPATIENT)
Dept: NEUROLOGY | Facility: CLINIC | Age: 67
End: 2022-12-19

## 2022-12-19 LAB
ATRIAL RATE: 66 BPM
P AXIS: 31 DEGREES
P-R INTERVAL: 154 MS
Q-T INTERVAL: 390 MS
QRS DURATION: 88 MS
QTC CALCULATION (BEZET): 408 MS
R AXIS: 10 DEGREES
T AXIS: 75 DEGREES
VENTRICULAR RATE: 66 BPM

## 2022-12-19 NOTE — TELEPHONE ENCOUNTER
[facility] SCREENING    1. Date of ED visit/TIA diagnosis:  12/20/22   Time of discharge from ED:  2009    2. Is patient currently admitted? No   If YES - TIA Clinic Appointment not required. 3. Does patient already see an [facility] neurologist?  Yes  Name:  no   (if YES - TIA Clinic Appointment not required. Route message on to patient's neurologist)    4. Patient's current anti-platelet therapy:  NONE    5. Patient's current statin therapy:      6. Has 2D Echo with bubble test been done? No  Date:      7. Is TIA Clinic Appointment indicated? Yes     If YES - route encounter to [address] [place] [address] to schedule patient for appointment NO LATER THAN 48 HOURS AFTER ED DISCHARGE. If UNSURE - route encounter to clinic provider for recommendation. Patient scheduled with Dr. Donald Das 12/20/2022 in [place] office.

## 2022-12-20 ENCOUNTER — LAB ENCOUNTER (OUTPATIENT)
Dept: LAB | Age: 67
End: 2022-12-20
Attending: Other
Payer: COMMERCIAL

## 2022-12-20 ENCOUNTER — OFFICE VISIT (OUTPATIENT)
Dept: NEUROLOGY | Facility: CLINIC | Age: 67
End: 2022-12-20
Payer: COMMERCIAL

## 2022-12-20 VITALS
BODY MASS INDEX: 36 KG/M2 | RESPIRATION RATE: 16 BRPM | SYSTOLIC BLOOD PRESSURE: 168 MMHG | WEIGHT: 240 LBS | HEART RATE: 89 BPM | DIASTOLIC BLOOD PRESSURE: 76 MMHG

## 2022-12-20 DIAGNOSIS — Z86.73 HISTORY OF STROKE: Primary | ICD-10-CM

## 2022-12-20 DIAGNOSIS — Z86.73 HISTORY OF STROKE: ICD-10-CM

## 2022-12-20 DIAGNOSIS — G45.9 TIA (TRANSIENT ISCHEMIC ATTACK): ICD-10-CM

## 2022-12-20 PROCEDURE — 85705 THROMBOPLASTIN INHIBITION: CPT

## 2022-12-20 PROCEDURE — 85598 HEXAGNAL PHOSPH PLTLT NEUTRL: CPT

## 2022-12-20 PROCEDURE — 85390 FIBRINOLYSINS SCREEN I&R: CPT

## 2022-12-20 PROCEDURE — 85613 RUSSELL VIPER VENOM DILUTED: CPT

## 2022-12-20 PROCEDURE — 3078F DIAST BP <80 MM HG: CPT | Performed by: OTHER

## 2022-12-20 PROCEDURE — 85730 THROMBOPLASTIN TIME PARTIAL: CPT

## 2022-12-20 PROCEDURE — 86146 BETA-2 GLYCOPROTEIN ANTIBODY: CPT

## 2022-12-20 PROCEDURE — 86147 CARDIOLIPIN ANTIBODY EA IG: CPT

## 2022-12-20 PROCEDURE — 3077F SYST BP >= 140 MM HG: CPT | Performed by: OTHER

## 2022-12-20 PROCEDURE — 85610 PROTHROMBIN TIME: CPT

## 2022-12-20 PROCEDURE — 99204 OFFICE O/P NEW MOD 45 MIN: CPT | Performed by: OTHER

## 2022-12-20 PROCEDURE — 85732 THROMBOPLASTIN TIME PARTIAL: CPT

## 2022-12-20 RX ORDER — ACETAMINOPHEN 160 MG
2000 TABLET,DISINTEGRATING ORAL DAILY
COMMUNITY

## 2022-12-20 RX ORDER — MAGNESIUM 200 MG
400 TABLET ORAL
COMMUNITY

## 2022-12-20 RX ORDER — METFORMIN HYDROCHLORIDE 500 MG/1
500 TABLET, EXTENDED RELEASE ORAL 2 TIMES DAILY
COMMUNITY
Start: 2022-10-02 | End: 2022-12-20 | Stop reason: ALTCHOICE

## 2022-12-20 RX ORDER — UBIDECARENONE 100 MG
100 CAPSULE ORAL DAILY
COMMUNITY

## 2022-12-20 RX ORDER — GLIPIZIDE 5 MG/1
5 TABLET ORAL EVERY MORNING
COMMUNITY
Start: 2022-12-06

## 2022-12-20 RX ORDER — CALCIUM CARBONATE/VITAMIN D3 500-10/5ML
LIQUID (ML) ORAL
COMMUNITY

## 2022-12-20 RX ORDER — SILDENAFIL 50 MG/1
TABLET, FILM COATED ORAL
COMMUNITY
Start: 2022-11-07

## 2022-12-20 NOTE — PROGRESS NOTES
Patient denies any TIA symptoms today. [facility] ER on 12/18/2022 with TIA symptoms. Pt had a stroke 5 years ago. Pr reports he has NOT been using his CPAP machine the last two months.

## 2022-12-22 LAB
B2 GLYCOPROT1 IGG SERPL IA-ACNC: 121 U/ML (ref ?–7)
B2 GLYCOPROT1 IGM SERPL IA-ACNC: <2.4 U/ML (ref ?–7)
CARDIOLIPIN IGG SERPL-ACNC: 30 GPL (ref ?–10)
CARDIOLIPIN IGM SERPL-ACNC: 2.8 MPL (ref ?–10)

## 2022-12-23 LAB
APTT PPP: 27.7 SECONDS (ref 23.3–35.6)
LA 3 SCREEN W REFLEX-IMP: NEGATIVE
PROTHROMBIN TIME: 14.3 SECONDS (ref 11.6–14.8)
SCREEN DRVVT: 1.19 S (ref 0–1.29)
SCREEN DRVVT: NEGATIVE S
STACLOT LA DELTA: 2 SECONDS (ref ?–8)

## 2022-12-26 DIAGNOSIS — I63.00 CEREBROVASCULAR ACCIDENT (CVA) DUE TO THROMBOSIS OF PRECEREBRAL ARTERY (HCC): Primary | ICD-10-CM

## 2022-12-29 ENCOUNTER — TELEPHONE (OUTPATIENT)
Dept: SURGERY | Facility: CLINIC | Age: 67
End: 2022-12-29

## 2022-12-29 ENCOUNTER — TELEPHONE (OUTPATIENT)
Dept: HEMATOLOGY/ONCOLOGY | Facility: HOSPITAL | Age: 67
End: 2022-12-29

## 2022-12-29 NOTE — TELEPHONE ENCOUNTER
Patient wife is calling to schedule consult appointment, referred by Dr. Salinas Citizen, diagnosis: Cerebrovascular accident (CVA) due to thrombosis of precerebral artery ([facility]  Call back number is [phone]

## 2022-12-30 ENCOUNTER — OFFICE VISIT (OUTPATIENT)
Dept: HEMATOLOGY/ONCOLOGY | Facility: HOSPITAL | Age: 67
End: 2022-12-30
Attending: Other
Payer: COMMERCIAL

## 2022-12-30 VITALS
BODY MASS INDEX: 37.98 KG/M2 | DIASTOLIC BLOOD PRESSURE: 62 MMHG | WEIGHT: 242 LBS | RESPIRATION RATE: 18 BRPM | HEIGHT: 67 IN | OXYGEN SATURATION: 96 % | HEART RATE: 73 BPM | TEMPERATURE: 98 F | SYSTOLIC BLOOD PRESSURE: 157 MMHG

## 2022-12-30 DIAGNOSIS — R76.0 ANTIPHOSPHOLIPID ANTIBODY POSITIVE: ICD-10-CM

## 2022-12-30 DIAGNOSIS — Z86.711 HISTORY OF PULMONARY EMBOLISM: Primary | ICD-10-CM

## 2022-12-30 DIAGNOSIS — G45.9 TIA (TRANSIENT ISCHEMIC ATTACK): ICD-10-CM

## 2022-12-30 PROCEDURE — 99204 OFFICE O/P NEW MOD 45 MIN: CPT | Performed by: INTERNAL MEDICINE

## 2022-12-30 RX ORDER — HYDROCHLOROTHIAZIDE 25 MG/1
25 TABLET ORAL DAILY
COMMUNITY
Start: 2022-12-23

## 2022-12-30 RX ORDER — ENOXAPARIN SODIUM 150 MG/ML
110 INJECTION SUBCUTANEOUS 2 TIMES DAILY
Qty: 60 EACH | Refills: 3 | Status: SHIPPED | OUTPATIENT
Start: 2022-12-30

## 2022-12-31 NOTE — CONSULTS
[facility]    PATIENT'S NAME: Jenn Jiang   CONSULTING PHYSICIAN: Joseline Pemberton. Zahra Guajardo MD   PATIENT ACCOUNT #: [de-identified] LOCATION: [address] [place] [address] RECORD #: [id number] YOB: 1955   CONSULTATION DATE: 12/30/2022       [facility] NEW PATIENT CONSULT    REQUESTING PROVIDER:  Dr. Radha Carlisle. REASON FOR CONSULTATION:  History of stroke, TIA, pulmonary embolus, antiphospholipid antibody positive. HISTORY OF PRESENT ILLNESS:  Patient is a pleasant 79-year-old male being evaluated by Hematology for positive antiphospholipid antibody with history of pulmonary embolus, stroke/TIA. The patient states he originally was diagnosed with venous thromboembolic disease with pulmonary embolus in the setting of multiple hip surgeries starting in 2015. He was on anticoagulation, rivaroxaban, when he had ischemic stroke in 2017. He subsequently states he also had a recurrent PE despite anticoagulation and was changed eventually from rivaroxaban to apixaban. The patient was subsequently evaluated in December 2022 for likely TIA despite full anticoagulation with apixaban. He was evaluated by Neurology who performed antiphospholipid testing which was normal except for anti-beta-2 glycoprotein IgG of 121. The patient has remained on apixaban. He is currently feeling reasonably well overall. He denies any fevers or chills. He does have a history of temporary IVC filter placement that has subsequently been removed. He is able to complete his activities of daily living. He is otherwise without complaints. PAST MEDICAL HISTORY:  Hypertension, diabetes, atrial fibrillation, coronary artery disease, BPH, history of stroke, pulmonary embolus, IVC filter placement, IVC filter placement removal as outlined above. MEDICATIONS:  Apixaban 5 mg twice daily, aspirin 81 mg, vitamin D, glipizide, magnesium, Lopressor, Prilosec, Viagra, zinc.    ALLERGIES:  Statins.      SOCIAL HISTORY: Denies alcohol, tobacco, or illicit drug use. FAMILY MEDICAL HISTORY:  No history of any venous thromboembolic disease in immediate family members. There is a paternal cousin with antiphospholipid syndrome. REVIEW OF SYSTEMS:  All other systems reviewed and negative x12. PHYSICAL EXAMINATION:    GENERAL:  No acute distress. Alert and oriented. VITAL SIGNS:   ECOG performance status is 0. Weight is 101 kg. Blood pressure is 137/62, pulse 72, respiratory rate 16, temperature is 98.2 Fahrenheit, pulse ox 96% on room air. HEENT:  Moist mucous membranes. Oropharynx clear. NECK:  Supple. LUNGS:  Symmetric expansion  HEART:  Good distal pulses. ABDOMEN:  Soft. EXTREMITIES:  No edema. SKIN:   No visible lesions. LYMPHATICS:  No visible adenopathy. NEUROLOGIC:  Moving all extremities. Cranial nerves intact. PSYCHIATRIC:  Appropriate mood, appropriate affect. MUSCULOSKELETAL:  No deformity. LABORATORY DATA:  Reviewed as per HPI. Please see above for details. IMAGING:  CTA from 12/18/2022, brain, reviewed, showing no large vessel occlusion. ASSESSMENT AND PLAN:  The patient is a pleasant 79-year-old male being evaluated by Hematology for stroke, recurrent pulmonary embolus and a positive antiphospholipid antibody. The patient's history is very concerning for antiphospholipid antibody syndrome with rivaroxaban/apixaban failure. Technically, his antiphospholipid antibody will need to be repeated 12 weeks apart. However, given his current clinical scenario and his strongly positive antibody greater than 120, we will arrange for him to be transitioned to warfarin with a goal INR of 2 to 3 and he should continue his aspirin 81 mg daily. He will need bridging with Lovenox as he begins warfarin and we will send orders to his pharmacy for Lovenox and help him connected with Coumadin clinic for long-term warfarin management.     Thank you very much for the consultation request and for allowing us to participate in the care of this delightful patient. Dictated By Hemanth Jimenez MD  d: 12/30/2022 15:11:11  t: 12/30/2022 21:01:51  [facility] [id number]  [facility]/    cc: Dr. Tye Asencio

## 2023-01-04 ENCOUNTER — TELEPHONE (OUTPATIENT)
Dept: HEMATOLOGY/ONCOLOGY | Facility: HOSPITAL | Age: 68
End: 2023-01-04

## 2023-01-04 NOTE — TELEPHONE ENCOUNTER
rec'd call from Dr Poppy Ratliff office stating they do not do coumadin monitoring. Call placed to patient and offered to assist to establish care with an [facility] physician for enrollment into [place] [address] coumadin clinic. Patient wishes to discuss with Dr Poppy Ratliff. I strongly encouraged Magalis Nichols to promptly resolve this issue as he is not on a therapeutic medication for the prevention of blood clots, he needs to be on coumadin.  He confirms he is aware and will meet with Dr Poppy Ratliff on Friday and will call us if he needs assistance with PCP at [place] [address].

## 2023-01-12 ENCOUNTER — TELEPHONE (OUTPATIENT)
Dept: HEMATOLOGY/ONCOLOGY | Facility: HOSPITAL | Age: 68
End: 2023-01-12

## 2023-01-12 NOTE — TELEPHONE ENCOUNTER
Called patient to follow-up on transition to warfarin for likely antiphospholipid antibody syndrome with cerebrovascular accident. The patient states he met with his primary care physician and after their discussion he was reluctant to transition. We reinforced that based on his antibody titers and diagnosis warfarin would be standard of care and apixaban has clearly been ineffective. It is our strong recommendation to transition to warfarin,  he expressed understanding and will continue to think things over.   We offered follow-up appointment to discuss further

## 2023-03-09 ENCOUNTER — APPOINTMENT (OUTPATIENT)
Dept: GENERAL RADIOLOGY | Facility: HOSPITAL | Age: 68
End: 2023-03-09
Attending: STUDENT IN AN ORGANIZED HEALTH CARE EDUCATION/TRAINING PROGRAM
Payer: COMMERCIAL

## 2023-03-09 ENCOUNTER — APPOINTMENT (OUTPATIENT)
Dept: GENERAL RADIOLOGY | Facility: HOSPITAL | Age: 68
End: 2023-03-09
Attending: EMERGENCY MEDICINE
Payer: COMMERCIAL

## 2023-03-09 ENCOUNTER — HOSPITAL ENCOUNTER (INPATIENT)
Facility: HOSPITAL | Age: 68
LOS: 4 days | Discharge: HOME HEALTH | End: 2023-03-14
Attending: EMERGENCY MEDICINE | Admitting: HOSPITALIST
Payer: COMMERCIAL

## 2023-03-09 DIAGNOSIS — T84.52XA LEFT HIP PROSTHETIC JOINT INFECTION (HCC): ICD-10-CM

## 2023-03-09 DIAGNOSIS — T84.52XA INFECTION ASSOCIATED WITH INTERNAL LEFT HIP PROSTHESIS, INITIAL ENCOUNTER (HCC): Primary | ICD-10-CM

## 2023-03-09 LAB
ALBUMIN SERPL-MCNC: 3.5 G/DL (ref 3.4–5)
ALP LIVER SERPL-CCNC: 61 U/L
ALT SERPL-CCNC: 32 U/L
ANION GAP SERPL CALC-SCNC: 7 MMOL/L (ref 0–18)
ANTIBODY SCREEN: NEGATIVE
APTT PPP: 29.5 SECONDS (ref 23.3–35.6)
AST SERPL-CCNC: 25 U/L (ref 15–37)
BILIRUB DIRECT SERPL-MCNC: 0.2 MG/DL (ref 0–0.2)
BILIRUB SERPL-MCNC: 0.4 MG/DL (ref 0.1–2)
BUN BLD-MCNC: 33 MG/DL (ref 7–18)
BUN/CREAT SERPL: 21.9 (ref 10–20)
CALCIUM BLD-MCNC: 9.6 MG/DL (ref 8.5–10.1)
CHLORIDE SERPL-SCNC: 105 MMOL/L (ref 98–112)
CO2 SERPL-SCNC: 29 MMOL/L (ref 21–32)
CREAT BLD-MCNC: 1.51 MG/DL
CRP SERPL-MCNC: 0.76 MG/DL (ref ?–0.3)
ERYTHROCYTE [SEDIMENTATION RATE] IN BLOOD: 64 MM/HR
GFR SERPLBLD BASED ON 1.73 SQ M-ARVRAT: 51 ML/MIN/1.73M2 (ref 60–?)
GLUCOSE BLD-MCNC: 175 MG/DL (ref 70–99)
INR BLD: 1.07 (ref 0.85–1.16)
OSMOLALITY SERPL CALC.SUM OF ELEC: 304 MOSM/KG (ref 275–295)
POTASSIUM SERPL-SCNC: 4.6 MMOL/L (ref 3.5–5.1)
PROT SERPL-MCNC: 7.5 G/DL (ref 6.4–8.2)
PROTHROMBIN TIME: 13.8 SECONDS (ref 11.6–14.8)
RH BLOOD TYPE: POSITIVE
SARS-COV-2 RNA RESP QL NAA+PROBE: NOT DETECTED
SODIUM SERPL-SCNC: 141 MMOL/L (ref 136–145)

## 2023-03-09 PROCEDURE — 99223 1ST HOSP IP/OBS HIGH 75: CPT | Performed by: INTERNAL MEDICINE

## 2023-03-09 PROCEDURE — 73502 X-RAY EXAM HIP UNI 2-3 VIEWS: CPT | Performed by: EMERGENCY MEDICINE

## 2023-03-09 PROCEDURE — 71045 X-RAY EXAM CHEST 1 VIEW: CPT | Performed by: STUDENT IN AN ORGANIZED HEALTH CARE EDUCATION/TRAINING PROGRAM

## 2023-03-09 RX ORDER — DEXTROSE MONOHYDRATE 25 G/50ML
50 INJECTION, SOLUTION INTRAVENOUS
Status: DISCONTINUED | OUTPATIENT
Start: 2023-03-09 | End: 2023-03-14

## 2023-03-09 RX ORDER — MORPHINE SULFATE 4 MG/ML
4 INJECTION, SOLUTION INTRAMUSCULAR; INTRAVENOUS ONCE
Status: DISCONTINUED | OUTPATIENT
Start: 2023-03-09 | End: 2023-03-14

## 2023-03-09 RX ORDER — CEFAZOLIN SODIUM/WATER 2 G/20 ML
2 SYRINGE (ML) INTRAVENOUS ONCE
Status: COMPLETED | OUTPATIENT
Start: 2023-03-10 | End: 2023-03-10

## 2023-03-09 RX ORDER — NICOTINE POLACRILEX 4 MG
15 LOZENGE BUCCAL
Status: DISCONTINUED | OUTPATIENT
Start: 2023-03-09 | End: 2023-03-14

## 2023-03-09 RX ORDER — NICOTINE POLACRILEX 4 MG
30 LOZENGE BUCCAL
Status: DISCONTINUED | OUTPATIENT
Start: 2023-03-09 | End: 2023-03-14

## 2023-03-09 RX ORDER — VANCOMYCIN 1.75 GRAM/500 ML IN 0.9 % SODIUM CHLORIDE INTRAVENOUS
15 ONCE
Status: DISCONTINUED | OUTPATIENT
Start: 2023-03-10 | End: 2023-03-12 | Stop reason: CLARIF

## 2023-03-10 ENCOUNTER — ANESTHESIA (OUTPATIENT)
Dept: SURGERY | Facility: HOSPITAL | Age: 68
End: 2023-03-10
Payer: COMMERCIAL

## 2023-03-10 ENCOUNTER — APPOINTMENT (OUTPATIENT)
Dept: GENERAL RADIOLOGY | Facility: HOSPITAL | Age: 68
End: 2023-03-10
Attending: STUDENT IN AN ORGANIZED HEALTH CARE EDUCATION/TRAINING PROGRAM
Payer: COMMERCIAL

## 2023-03-10 ENCOUNTER — ANESTHESIA EVENT (OUTPATIENT)
Dept: SURGERY | Facility: HOSPITAL | Age: 68
End: 2023-03-10
Payer: COMMERCIAL

## 2023-03-10 LAB
ANION GAP SERPL CALC-SCNC: 6 MMOL/L (ref 0–18)
ATRIAL RATE: 83 BPM
BASOPHILS # BLD AUTO: 0.03 X10(3) UL (ref 0–0.2)
BASOPHILS # BLD AUTO: 0.04 X10(3) UL (ref 0–0.2)
BASOPHILS NFR BLD AUTO: 0.4 %
BASOPHILS NFR BLD AUTO: 0.5 %
BUN BLD-MCNC: 31 MG/DL (ref 7–18)
BUN/CREAT SERPL: 23.8 (ref 10–20)
CALCIUM BLD-MCNC: 9.1 MG/DL (ref 8.5–10.1)
CHLORIDE SERPL-SCNC: 106 MMOL/L (ref 98–112)
CO2 SERPL-SCNC: 28 MMOL/L (ref 21–32)
CREAT BLD-MCNC: 1.3 MG/DL
DEPRECATED RDW RBC AUTO: 38.5 FL (ref 35.1–46.3)
DEPRECATED RDW RBC AUTO: 38.6 FL (ref 35.1–46.3)
DEPRECATED RDW RBC AUTO: 38.7 FL (ref 35.1–46.3)
EOSINOPHIL # BLD AUTO: 0.19 X10(3) UL (ref 0–0.7)
EOSINOPHIL # BLD AUTO: 0.24 X10(3) UL (ref 0–0.7)
EOSINOPHIL NFR BLD AUTO: 2.3 %
EOSINOPHIL NFR BLD AUTO: 3.1 %
ERYTHROCYTE [DISTWIDTH] IN BLOOD BY AUTOMATED COUNT: 16.8 % (ref 11–15)
ERYTHROCYTE [DISTWIDTH] IN BLOOD BY AUTOMATED COUNT: 16.8 % (ref 11–15)
ERYTHROCYTE [DISTWIDTH] IN BLOOD BY AUTOMATED COUNT: 17.5 % (ref 11–15)
EST. AVERAGE GLUCOSE BLD GHB EST-MCNC: 180 MG/DL (ref 68–126)
GFR SERPLBLD BASED ON 1.73 SQ M-ARVRAT: 61 ML/MIN/1.73M2 (ref 60–?)
GLUCOSE BLD-MCNC: 158 MG/DL (ref 70–99)
GLUCOSE BLDC GLUCOMTR-MCNC: 158 MG/DL (ref 70–99)
GLUCOSE BLDC GLUCOMTR-MCNC: 162 MG/DL (ref 70–99)
GLUCOSE BLDC GLUCOMTR-MCNC: 180 MG/DL (ref 70–99)
GLUCOSE BLDC GLUCOMTR-MCNC: 256 MG/DL (ref 70–99)
HBA1C MFR BLD: 7.9 % (ref ?–5.7)
HCT VFR BLD AUTO: 30.4 %
HCT VFR BLD AUTO: 32.5 %
HCT VFR BLD AUTO: 34.7 %
HGB BLD-MCNC: 10.2 G/DL
HGB BLD-MCNC: 10.9 G/DL
HGB BLD-MCNC: 9.6 G/DL
IMM GRANULOCYTES # BLD AUTO: 0.06 X10(3) UL (ref 0–1)
IMM GRANULOCYTES # BLD AUTO: 0.06 X10(3) UL (ref 0–1)
IMM GRANULOCYTES NFR BLD: 0.7 %
IMM GRANULOCYTES NFR BLD: 0.8 %
INR BLD: 1.08 (ref 0.85–1.16)
LYMPHOCYTES # BLD AUTO: 1.88 X10(3) UL (ref 1–4)
LYMPHOCYTES # BLD AUTO: 2.68 X10(3) UL (ref 1–4)
LYMPHOCYTES NFR BLD AUTO: 22.8 %
LYMPHOCYTES NFR BLD AUTO: 34.1 %
MAGNESIUM SERPL-MCNC: 1.9 MG/DL (ref 1.6–2.6)
MCH RBC QN AUTO: 21 PG (ref 26–34)
MCHC RBC AUTO-ENTMCNC: 31.4 G/DL (ref 31–37)
MCHC RBC AUTO-ENTMCNC: 31.4 G/DL (ref 31–37)
MCHC RBC AUTO-ENTMCNC: 31.6 G/DL (ref 31–37)
MCV RBC AUTO: 66.5 FL
MCV RBC AUTO: 67 FL
MCV RBC AUTO: 67 FL
MONOCYTES # BLD AUTO: 0.63 X10(3) UL (ref 0.1–1)
MONOCYTES # BLD AUTO: 0.69 X10(3) UL (ref 0.1–1)
MONOCYTES NFR BLD AUTO: 8 %
MONOCYTES NFR BLD AUTO: 8.4 %
NEUTROPHILS # BLD AUTO: 4.21 X10 (3) UL (ref 1.5–7.7)
NEUTROPHILS # BLD AUTO: 4.21 X10(3) UL (ref 1.5–7.7)
NEUTROPHILS # BLD AUTO: 5.4 X10 (3) UL (ref 1.5–7.7)
NEUTROPHILS # BLD AUTO: 5.4 X10(3) UL (ref 1.5–7.7)
NEUTROPHILS NFR BLD AUTO: 53.5 %
NEUTROPHILS NFR BLD AUTO: 65.4 %
OSMOLALITY SERPL CALC.SUM OF ELEC: 300 MOSM/KG (ref 275–295)
P AXIS: 31 DEGREES
P-R INTERVAL: 150 MS
PHOSPHATE SERPL-MCNC: 4.1 MG/DL (ref 2.5–4.9)
PLATELET # BLD AUTO: 229 10(3)UL (ref 150–450)
PLATELET # BLD AUTO: 242 10(3)UL (ref 150–450)
PLATELET # BLD AUTO: 246 10(3)UL (ref 150–450)
POTASSIUM SERPL-SCNC: 4.4 MMOL/L (ref 3.5–5.1)
PROTHROMBIN TIME: 13.9 SECONDS (ref 11.6–14.8)
Q-T INTERVAL: 372 MS
QRS DURATION: 94 MS
QTC CALCULATION (BEZET): 437 MS
R AXIS: 27 DEGREES
RBC # BLD AUTO: 4.57 X10(6)UL
RBC # BLD AUTO: 4.85 X10(6)UL
RBC # BLD AUTO: 5.18 X10(6)UL
SODIUM SERPL-SCNC: 140 MMOL/L (ref 136–145)
T AXIS: 70 DEGREES
VENTRICULAR RATE: 83 BPM
WBC # BLD AUTO: 12 X10(3) UL (ref 4–11)
WBC # BLD AUTO: 7.9 X10(3) UL (ref 4–11)
WBC # BLD AUTO: 8.3 X10(3) UL (ref 4–11)

## 2023-03-10 PROCEDURE — 0SRS03Z REPLACEMENT OF LEFT HIP JOINT, FEMORAL SURFACE WITH CERAMIC SYNTHETIC SUBSTITUTE, OPEN APPROACH: ICD-10-PCS | Performed by: STUDENT IN AN ORGANIZED HEALTH CARE EDUCATION/TRAINING PROGRAM

## 2023-03-10 PROCEDURE — 99233 SBSQ HOSP IP/OBS HIGH 50: CPT | Performed by: HOSPITALIST

## 2023-03-10 PROCEDURE — 72170 X-RAY EXAM OF PELVIS: CPT | Performed by: STUDENT IN AN ORGANIZED HEALTH CARE EDUCATION/TRAINING PROGRAM

## 2023-03-10 PROCEDURE — 0SPB09Z REMOVAL OF LINER FROM LEFT HIP JOINT, OPEN APPROACH: ICD-10-PCS | Performed by: STUDENT IN AN ORGANIZED HEALTH CARE EDUCATION/TRAINING PROGRAM

## 2023-03-10 PROCEDURE — 0SUE09Z SUPPLEMENT LEFT HIP JOINT, ACETABULAR SURFACE WITH LINER, OPEN APPROACH: ICD-10-PCS | Performed by: STUDENT IN AN ORGANIZED HEALTH CARE EDUCATION/TRAINING PROGRAM

## 2023-03-10 PROCEDURE — 0SPS0JZ REMOVAL OF SYNTHETIC SUBSTITUTE FROM LEFT HIP JOINT, FEMORAL SURFACE, OPEN APPROACH: ICD-10-PCS | Performed by: STUDENT IN AN ORGANIZED HEALTH CARE EDUCATION/TRAINING PROGRAM

## 2023-03-10 DEVICE — BIPOLAR SHELL 44MM OD: Type: IMPLANTABLE DEVICE | Site: HIP | Status: FUNCTIONAL

## 2023-03-10 DEVICE — BIPOLAR LINER 44/45/46 ODX28MM: Type: IMPLANTABLE DEVICE | Site: HIP | Status: FUNCTIONAL

## 2023-03-10 DEVICE — BIOLOX® OPTION HD/ADPT, 12/14, 28 X +3.5
Type: IMPLANTABLE DEVICE | Site: HIP | Status: FUNCTIONAL
Brand: BIOLOX® OPTION

## 2023-03-10 RX ORDER — ASPIRIN 81 MG/1
81 TABLET, CHEWABLE ORAL DAILY
Status: DISCONTINUED | OUTPATIENT
Start: 2023-03-10 | End: 2023-03-14

## 2023-03-10 RX ORDER — NALOXONE HYDROCHLORIDE 0.4 MG/ML
80 INJECTION, SOLUTION INTRAMUSCULAR; INTRAVENOUS; SUBCUTANEOUS AS NEEDED
Status: DISCONTINUED | OUTPATIENT
Start: 2023-03-10 | End: 2023-03-10 | Stop reason: HOSPADM

## 2023-03-10 RX ORDER — PANTOPRAZOLE SODIUM 40 MG/1
40 TABLET, DELAYED RELEASE ORAL
Status: DISCONTINUED | OUTPATIENT
Start: 2023-03-10 | End: 2023-03-14

## 2023-03-10 RX ORDER — SODIUM CHLORIDE 9 MG/ML
INJECTION, SOLUTION INTRAVENOUS CONTINUOUS PRN
Status: DISCONTINUED | OUTPATIENT
Start: 2023-03-10 | End: 2023-03-10 | Stop reason: SURG

## 2023-03-10 RX ORDER — SODIUM CHLORIDE, SODIUM LACTATE, POTASSIUM CHLORIDE, CALCIUM CHLORIDE 600; 310; 30; 20 MG/100ML; MG/100ML; MG/100ML; MG/100ML
INJECTION, SOLUTION INTRAVENOUS CONTINUOUS
Status: DISCONTINUED | OUTPATIENT
Start: 2023-03-10 | End: 2023-03-10 | Stop reason: HOSPADM

## 2023-03-10 RX ORDER — VANCOMYCIN HYDROCHLORIDE
1250 EVERY 12 HOURS
Status: DISCONTINUED | OUTPATIENT
Start: 2023-03-11 | End: 2023-03-13

## 2023-03-10 RX ORDER — FAMOTIDINE 10 MG/ML
20 INJECTION, SOLUTION INTRAVENOUS 2 TIMES DAILY
Status: DISCONTINUED | OUTPATIENT
Start: 2023-03-10 | End: 2023-03-14

## 2023-03-10 RX ORDER — OXYCODONE HYDROCHLORIDE 5 MG/1
10 TABLET ORAL ONCE
Status: COMPLETED | OUTPATIENT
Start: 2023-03-10 | End: 2023-03-10

## 2023-03-10 RX ORDER — POLYETHYLENE GLYCOL 3350 17 G/17G
17 POWDER, FOR SOLUTION ORAL DAILY PRN
Status: DISCONTINUED | OUTPATIENT
Start: 2023-03-10 | End: 2023-03-14

## 2023-03-10 RX ORDER — SODIUM CHLORIDE 9 MG/ML
INJECTION, SOLUTION INTRAVENOUS CONTINUOUS
Status: DISCONTINUED | OUTPATIENT
Start: 2023-03-10 | End: 2023-03-11

## 2023-03-10 RX ORDER — MORPHINE SULFATE 10 MG/ML
6 INJECTION, SOLUTION INTRAMUSCULAR; INTRAVENOUS EVERY 10 MIN PRN
Status: DISCONTINUED | OUTPATIENT
Start: 2023-03-10 | End: 2023-03-10 | Stop reason: HOSPADM

## 2023-03-10 RX ORDER — SENNOSIDES 8.6 MG
17.2 TABLET ORAL NIGHTLY
Status: DISCONTINUED | OUTPATIENT
Start: 2023-03-10 | End: 2023-03-14

## 2023-03-10 RX ORDER — MORPHINE SULFATE 4 MG/ML
4 INJECTION, SOLUTION INTRAMUSCULAR; INTRAVENOUS EVERY 10 MIN PRN
Status: DISCONTINUED | OUTPATIENT
Start: 2023-03-10 | End: 2023-03-10 | Stop reason: HOSPADM

## 2023-03-10 RX ORDER — AMLODIPINE BESYLATE 10 MG/1
10 TABLET ORAL DAILY
COMMUNITY
End: 2023-03-14

## 2023-03-10 RX ORDER — SODIUM PHOSPHATE, DIBASIC AND SODIUM PHOSPHATE, MONOBASIC 7; 19 G/133ML; G/133ML
1 ENEMA RECTAL ONCE AS NEEDED
Status: DISCONTINUED | OUTPATIENT
Start: 2023-03-10 | End: 2023-03-14

## 2023-03-10 RX ORDER — VANCOMYCIN 1.75 GRAM/500 ML IN 0.9 % SODIUM CHLORIDE INTRAVENOUS
15 EVERY 12 HOURS
Status: DISCONTINUED | OUTPATIENT
Start: 2023-03-10 | End: 2023-03-10 | Stop reason: DRUGHIGH

## 2023-03-10 RX ORDER — HYDROMORPHONE HYDROCHLORIDE 1 MG/ML
0.4 INJECTION, SOLUTION INTRAMUSCULAR; INTRAVENOUS; SUBCUTANEOUS EVERY 5 MIN PRN
Status: DISCONTINUED | OUTPATIENT
Start: 2023-03-10 | End: 2023-03-10 | Stop reason: HOSPADM

## 2023-03-10 RX ORDER — METOCLOPRAMIDE HYDROCHLORIDE 5 MG/ML
10 INJECTION INTRAMUSCULAR; INTRAVENOUS EVERY 8 HOURS PRN
Status: DISCONTINUED | OUTPATIENT
Start: 2023-03-10 | End: 2023-03-14

## 2023-03-10 RX ORDER — HYDROMORPHONE HYDROCHLORIDE 1 MG/ML
0.6 INJECTION, SOLUTION INTRAMUSCULAR; INTRAVENOUS; SUBCUTANEOUS EVERY 5 MIN PRN
Status: DISCONTINUED | OUTPATIENT
Start: 2023-03-10 | End: 2023-03-10 | Stop reason: HOSPADM

## 2023-03-10 RX ORDER — TRANEXAMIC ACID 10 MG/ML
INJECTION, SOLUTION INTRAVENOUS AS NEEDED
Status: DISCONTINUED | OUTPATIENT
Start: 2023-03-10 | End: 2023-03-10 | Stop reason: SURG

## 2023-03-10 RX ORDER — ACETAMINOPHEN 500 MG
1000 TABLET ORAL EVERY 6 HOURS
Status: DISCONTINUED | OUTPATIENT
Start: 2023-03-10 | End: 2023-03-14

## 2023-03-10 RX ORDER — AMLODIPINE BESYLATE 5 MG/1
5 TABLET ORAL DAILY
COMMUNITY

## 2023-03-10 RX ORDER — LOSARTAN POTASSIUM 100 MG/1
100 TABLET ORAL DAILY
Status: DISCONTINUED | OUTPATIENT
Start: 2023-03-10 | End: 2023-03-14

## 2023-03-10 RX ORDER — WARFARIN SODIUM 5 MG/1
5 TABLET ORAL NIGHTLY
COMMUNITY
Start: 2023-03-06

## 2023-03-10 RX ORDER — OXYCODONE HYDROCHLORIDE 5 MG/1
5 TABLET ORAL EVERY 4 HOURS PRN
Status: DISCONTINUED | OUTPATIENT
Start: 2023-03-10 | End: 2023-03-14

## 2023-03-10 RX ORDER — ONDANSETRON 2 MG/ML
4 INJECTION INTRAMUSCULAR; INTRAVENOUS EVERY 6 HOURS PRN
Status: DISCONTINUED | OUTPATIENT
Start: 2023-03-10 | End: 2023-03-14

## 2023-03-10 RX ORDER — BISACODYL 10 MG
10 SUPPOSITORY, RECTAL RECTAL
Status: DISCONTINUED | OUTPATIENT
Start: 2023-03-10 | End: 2023-03-14

## 2023-03-10 RX ORDER — SODIUM CHLORIDE, SODIUM LACTATE, POTASSIUM CHLORIDE, CALCIUM CHLORIDE 600; 310; 30; 20 MG/100ML; MG/100ML; MG/100ML; MG/100ML
INJECTION, SOLUTION INTRAVENOUS CONTINUOUS PRN
Status: DISCONTINUED | OUTPATIENT
Start: 2023-03-10 | End: 2023-03-10 | Stop reason: SURG

## 2023-03-10 RX ORDER — DIPHENHYDRAMINE HCL 25 MG
25 CAPSULE ORAL EVERY 4 HOURS PRN
Status: DISCONTINUED | OUTPATIENT
Start: 2023-03-10 | End: 2023-03-14

## 2023-03-10 RX ORDER — MAGNESIUM OXIDE 400 MG/1
400 TABLET ORAL DAILY
Status: DISCONTINUED | OUTPATIENT
Start: 2023-03-10 | End: 2023-03-14

## 2023-03-10 RX ORDER — FAMOTIDINE 20 MG/1
20 TABLET, FILM COATED ORAL 2 TIMES DAILY
Status: DISCONTINUED | OUTPATIENT
Start: 2023-03-10 | End: 2023-03-14

## 2023-03-10 RX ORDER — DEXAMETHASONE SODIUM PHOSPHATE 4 MG/ML
VIAL (ML) INJECTION AS NEEDED
Status: DISCONTINUED | OUTPATIENT
Start: 2023-03-10 | End: 2023-03-10 | Stop reason: SURG

## 2023-03-10 RX ORDER — ENOXAPARIN SODIUM 100 MG/ML
40 INJECTION SUBCUTANEOUS DAILY
Status: DISCONTINUED | OUTPATIENT
Start: 2023-03-11 | End: 2023-03-14

## 2023-03-10 RX ORDER — DIPHENHYDRAMINE HYDROCHLORIDE 50 MG/ML
25 INJECTION INTRAMUSCULAR; INTRAVENOUS ONCE AS NEEDED
Status: ACTIVE | OUTPATIENT
Start: 2023-03-10 | End: 2023-03-10

## 2023-03-10 RX ORDER — HYDROMORPHONE HYDROCHLORIDE 1 MG/ML
0.2 INJECTION, SOLUTION INTRAMUSCULAR; INTRAVENOUS; SUBCUTANEOUS EVERY 5 MIN PRN
Status: DISCONTINUED | OUTPATIENT
Start: 2023-03-10 | End: 2023-03-10 | Stop reason: HOSPADM

## 2023-03-10 RX ORDER — MORPHINE SULFATE 4 MG/ML
2 INJECTION, SOLUTION INTRAMUSCULAR; INTRAVENOUS EVERY 10 MIN PRN
Status: DISCONTINUED | OUTPATIENT
Start: 2023-03-10 | End: 2023-03-10 | Stop reason: HOSPADM

## 2023-03-10 RX ORDER — HYDROCHLOROTHIAZIDE 25 MG/1
25 TABLET ORAL DAILY
Status: DISCONTINUED | OUTPATIENT
Start: 2023-03-10 | End: 2023-03-14

## 2023-03-10 RX ORDER — METOPROLOL TARTRATE 50 MG/1
50 TABLET, FILM COATED ORAL 2 TIMES DAILY
Status: DISCONTINUED | OUTPATIENT
Start: 2023-03-10 | End: 2023-03-14

## 2023-03-10 RX ORDER — LIDOCAINE HYDROCHLORIDE 10 MG/ML
INJECTION, SOLUTION EPIDURAL; INFILTRATION; INTRACAUDAL; PERINEURAL AS NEEDED
Status: DISCONTINUED | OUTPATIENT
Start: 2023-03-10 | End: 2023-03-10 | Stop reason: SURG

## 2023-03-10 RX ORDER — VANCOMYCIN HYDROCHLORIDE 1 G/20ML
INJECTION, POWDER, LYOPHILIZED, FOR SOLUTION INTRAVENOUS AS NEEDED
Status: DISCONTINUED | OUTPATIENT
Start: 2023-03-10 | End: 2023-03-10 | Stop reason: HOSPADM

## 2023-03-10 RX ORDER — MIDAZOLAM HYDROCHLORIDE 1 MG/ML
INJECTION INTRAMUSCULAR; INTRAVENOUS AS NEEDED
Status: DISCONTINUED | OUTPATIENT
Start: 2023-03-10 | End: 2023-03-10 | Stop reason: SURG

## 2023-03-10 RX ORDER — ONDANSETRON 2 MG/ML
INJECTION INTRAMUSCULAR; INTRAVENOUS AS NEEDED
Status: DISCONTINUED | OUTPATIENT
Start: 2023-03-10 | End: 2023-03-10 | Stop reason: SURG

## 2023-03-10 RX ORDER — DIPHENHYDRAMINE HYDROCHLORIDE 50 MG/ML
12.5 INJECTION INTRAMUSCULAR; INTRAVENOUS EVERY 4 HOURS PRN
Status: DISCONTINUED | OUTPATIENT
Start: 2023-03-10 | End: 2023-03-14

## 2023-03-10 RX ORDER — DOCUSATE SODIUM 100 MG/1
100 CAPSULE, LIQUID FILLED ORAL 2 TIMES DAILY
Status: DISCONTINUED | OUTPATIENT
Start: 2023-03-10 | End: 2023-03-14

## 2023-03-10 RX ORDER — ROCURONIUM BROMIDE 10 MG/ML
INJECTION, SOLUTION INTRAVENOUS AS NEEDED
Status: DISCONTINUED | OUTPATIENT
Start: 2023-03-10 | End: 2023-03-10 | Stop reason: SURG

## 2023-03-10 RX ORDER — TRAMADOL HYDROCHLORIDE 50 MG/1
50 TABLET ORAL EVERY 6 HOURS PRN
Status: DISCONTINUED | OUTPATIENT
Start: 2023-03-10 | End: 2023-03-14

## 2023-03-10 RX ADMIN — DEXAMETHASONE SODIUM PHOSPHATE 4 MG: 4 MG/ML VIAL (ML) INJECTION at 19:41:00

## 2023-03-10 RX ADMIN — ONDANSETRON 4 MG: 2 INJECTION INTRAMUSCULAR; INTRAVENOUS at 19:41:00

## 2023-03-10 RX ADMIN — LIDOCAINE HYDROCHLORIDE 50 MG: 10 INJECTION, SOLUTION EPIDURAL; INFILTRATION; INTRACAUDAL; PERINEURAL at 17:27:00

## 2023-03-10 RX ADMIN — SODIUM CHLORIDE, SODIUM LACTATE, POTASSIUM CHLORIDE, CALCIUM CHLORIDE: 600; 310; 30; 20 INJECTION, SOLUTION INTRAVENOUS at 17:22:00

## 2023-03-10 RX ADMIN — SODIUM CHLORIDE: 9 INJECTION, SOLUTION INTRAVENOUS at 18:00:00

## 2023-03-10 RX ADMIN — ROCURONIUM BROMIDE 20 MG: 10 INJECTION, SOLUTION INTRAVENOUS at 17:40:00

## 2023-03-10 RX ADMIN — MIDAZOLAM HYDROCHLORIDE 2 MG: 1 INJECTION INTRAMUSCULAR; INTRAVENOUS at 17:27:00

## 2023-03-10 RX ADMIN — ROCURONIUM BROMIDE 30 MG: 10 INJECTION, SOLUTION INTRAVENOUS at 17:27:00

## 2023-03-10 RX ADMIN — CEFAZOLIN SODIUM/WATER 2 G: 2 G/20 ML SYRINGE (ML) INTRAVENOUS at 17:42:00

## 2023-03-10 RX ADMIN — TRANEXAMIC ACID 1000 MG: 10 INJECTION, SOLUTION INTRAVENOUS at 19:12:00

## 2023-03-10 RX ADMIN — TRANEXAMIC ACID 1000 MG: 10 INJECTION, SOLUTION INTRAVENOUS at 17:48:00

## 2023-03-10 NOTE — CM/SW NOTE
03/10/23 1500   CM/ Referral Data   Referral Source Nurse;   Reason for Referral Discharge planning   Informant Patient;Spouse/Significant Other   Medical Hx   Does patient have an established PCP? (Saba Horta)   Significant Past Medical/Mental Health Hx lt hip infection   Patient Info   Patient's Current Mental Status at Time of Assessment Alert;Oriented   Patient's Home Environment House   Number of Levels in Home 2   Number of Stair in Home 1 in 13 to bedroom   Patient lives with Spouse/Significant other   Patient Status Prior to Admission   Independent with ADLs and Mobility Yes   Discharge Needs   Anticipated D/C needs Infusion care;Home health care   Choice of Post-Acute Provider   Informed patient of right to choose their preferred provider Yes       CM self referred for dc planning. Received call from [address] at [facility], pt may need IV abx on dc. Met with pt and spouse at bedside. Above assessment obtained. Pt had original hip replacement in 2015 has had 3 revisions since. Post other admission pt dc'd home with [facility] and hopes to do the same post this admission. CM discussed potential for IV abx on dc. Pt verbalized understanding and ok with administering IV abx at home. CM requested [address] [place] [address] to send [facility] referral and Inf referral to [facility]. HH f2f entered. *PICC and final rx needed. Plan: Home with [facility]- Agency TBD and possibly IV abx from [facility]. / to remain available for support and/or discharge planning. Gurwinder Kruse.  Tameka Osuna RN, BSN  Nurse   [phone]

## 2023-03-10 NOTE — PHYSICAL THERAPY NOTE
PT order received from MD Samuels. Pt admitted through ED for increasing L hip pain and per chart was advised by Dr Madhu Rios to come to ED, will need hip revision +I&D. Scheduled today at 3:15pm.     Will complete orders from PCP and await new orders from surgeon post operatively with updated precautions and weight bearing status.

## 2023-03-10 NOTE — CM/SW NOTE
Department  notified of request for [address] referrals started. Assigned CM/SW to follow up with pt/family on further discharge planning.      Rachel Garcia   March 10, 2023   15:52 none

## 2023-03-10 NOTE — DISCHARGE INSTRUCTIONS
DISCHARGE INSTRUCTIONS:  PLACE ICE TO SURGICAL AREA 20-30 MINUTES ON/ 20-30 MINUTES OFF. THIS IS TO HELP WITH PAIN & SWELLING. TAKE YOUR MEDICATIONS AS PRESCRIBED BY YOUR DOCTOR BELOW.    AS YOU WERE INSTRUCTED BY PHYSICAL THERAPY TO EXERCISE HIP PRECAUTIONS, CONTINUE TO DO SO AFTER DISCHARGE    IT IS OK TO BEAR WEIGHT AS TOLERATED ON THE OPERATIVE EXTREMITY. CALL TO CONFIRM YOUR FOLLOW UP APPOINTMENT WITH DR. Ivett Montgomery TO BE SEEN IN 2-3 WEEKS POSTOP. [phone]    MEDICATIONS    POST OP MEDICATION REGIMEN              MULTI-MODAL   PAIN REGIMEN       DRUG   FOR   FREQUENCY & DURATION   QTY   NOTES     WEANING  TIPS       Gabapentin 100mg   Nerve pain   Take 2 tabs every 8 hours for 14 days    90   No refills You may discontinue this medication prior to 14 days if you do not tolerate it. Tylenol 500mg     Mild pain   Take 2 tabs every 6 hours     90   Can purchase over-the-counter    Stop using medication if no longer having pain. Tramadol 50mg     Moderate pain   Take 1-2 tabs every 6 hours only as needed   45 May prescribe a refill, but ideally, this should be tapered off after surgery Stop using this medication 2nd   As pain decreases over time, increase the interval between doses (6 hours to 8, then 12, then 24) to taper off the medication. BREAKTHROUGH PAIN         Oxycodone 5mg       Severe pain     Take 1-2 tabs every 4-6 hours only as needed for severe pain       40   Take at least 1 hr apart from Tramadol. Ideally, no refill. The goal is to taper off this medication as soon as possible, as it can be addictive and have side effects. Stop using this medication 1st if no longer having severe pain. BLOOD THINNER     Lovenox 40mg   Blood clot prevention   Take 1injection daily with warfarin until INR therapeutic         No refills   Complete the entire course of your blood thinner.                       STOOL SOFTENER     Sennokot 8.6/50mg   Constipation   Take 1 tab twice daily  while on opioids     60 Refer to discharge instructions if constipation persists even after taking this medication   Stop taking if having diarrhea or loose stools. ANTI-NAUSEA   Ondansetron 4mg   Nausea   Take 1 tab every 8 hours as needed if nauseous     20   No Refill      ANTI-ACID REFLUX / GASTRITIS   Pantoprazole 40mg   Acid reflux, gastric ulcer prophylaxis   Take 1 tab every day along with Meloxicam     60   May refill if Meloxicam refilled        DRESSING:    YOU HAVE A SPECIAL DRESSING CALLED A PREVENA DRESSING, OVER YOUR INCISION. THIS IS A TYPE OF NEGATIVE PRESSURE DRESSING THAT KEEPS THE WOUND DRY. IT IS CONNECTED TO A CANNISTER. MAKE SURE THAT THE CANNISTER IS POWERED ON AND NOT OUT OF BATTERY. THE DRESSING STAYS FOR 7 DAYS FROM SURGERY. THIS DRESSING SHOULD BE CHANGED TO AN AQUACEL AT 7 DAYS POST-OPERATIVELY. IF THE PREVENA DRESSING HAS CONTINUOUS AND PERSISTENT DRAINAGE, CALL YOUR SURGEON FIRST BEFORE CHANGING THE DRESSING TO AN AQUACEL DRESSING. YOU MAY SHOWER AS SOON AS THE AQUACEL DRESSING IS PLACED INSTEAD OF THE PREVENA DRESSING OVER YOUR INCISION AREA. IF THE DRESSING LEAKS OR COMES LOOSE BEFORE THE 7 DAY PERIOD CONTACT YOUR DOCTOR / SURGEON. AFTER   14 DAYS POST OPERATIVELY, THE AQUACEL DRESSING IS REMOVED BY PULLING ON THE EDGE OF THE DRESSING AND GENTLY STRETCHING IT, THEN PEEL IT OFF SLOWLY LIKE A BANDAID. IF YOU HAVE ANY QUESTIONS OR CONCERNS ABOUT THE DRESSING CONTACT YOUR DOCTOR.     Barbara Medina MD MPH  Orthopaedic Surgeon, Adult Hip and Knee Reconstruction  [facility] at [facility] [address] [facility] [place], [address]  Office: (y) [phone] (P) [phone]  Adminstrative Assistant: [address] nurse to check PT/INR blood test 2 days from discharge    [address]  [address]  [place], [address]  Phone: [phone]  Fax: [phone]    [facility]  [facility]:  [address] [facility]  [facility], [address]  Phone: [phone]  Fax: [phone]

## 2023-03-10 NOTE — PLAN OF CARE
A&Ox4. NPO since midnight for OR. Consents reviewed with patient and his spouse, signed and in chart. Denies pain. Voiding freely and ambulating independently. Seen by ID and will need PICC placement, plan for PICC on Monday.      Problem: Patient Centered Care  Goal: Patient preferences are identified and integrated in the patient's plan of care  Description: Interventions:  - What would you like us to know as we care for you?   - Provide timely, complete, and accurate information to patient/family  - Incorporate patient and family knowledge, values, beliefs, and cultural backgrounds into the planning and delivery of care  - Encourage patient/family to participate in care and decision-making at the level they choose  - Honor patient and family perspectives and choices  Outcome: Progressing     Problem: Diabetes/Glucose Control  Goal: Glucose maintained within prescribed range  Description: INTERVENTIONS:  - Monitor Blood Glucose as ordered  - Assess for signs and symptoms of hyperglycemia and hypoglycemia  - Administer ordered medications to maintain glucose within target range  - Assess barriers to adequate nutritional intake and initiate nutrition consult as needed  - Instruct patient on self management of diabetes  Outcome: Progressing

## 2023-03-10 NOTE — VASCULAR ACCESS
Vascular access consult note:  PICC insertion request for long term IV abx infusion received on 3/10 @1400. Per Diana Hoyt MD would like PICC placed prior to surgery scheduled at 3pm. Pt currently with working PIV. Confirmed with Dr Wood Bostwick- patient will likely stay as inpatient over the weekend. Will schedule patient for PICC placement on Monday 3/13/23.

## 2023-03-10 NOTE — PLAN OF CARE
Pt admitted from ED. Pain increasing in left hip over past 1.5 weeks. Advised by Dr. Marlene Trujillo to be admitted to the ER and have surgery. Surgery for hip revision and irrigation and debridement scheduled later this afternoon. NPO. Pt requests CHG bath to be done closer to surgery time. Alert and oriented x4 on room air. Hx of MADDI but no longer uses CPAP at home. ACHS. Last BM 3/9. Voiding freely. Ambulates independently. Call light within reach. Consents in chart. Problem: Patient Centered Care  Goal: Patient preferences are identified and integrated in the patient's plan of care  Description: Interventions:  - What would you like us to know as we care for you?  Has had several surgeries in the past  - Provide timely, complete, and accurate information to patient/family  - Incorporate patient and family knowledge, values, beliefs, and cultural backgrounds into the planning and delivery of care  - Encourage patient/family to participate in care and decision-making at the level they choose  - Honor patient and family perspectives and choices  Outcome: Progressing     Problem: Diabetes/Glucose Control  Goal: Glucose maintained within prescribed range  Description: INTERVENTIONS:  - Monitor Blood Glucose as ordered  - Assess for signs and symptoms of hyperglycemia and hypoglycemia  - Administer ordered medications to maintain glucose within target range  - Assess barriers to adequate nutritional intake and initiate nutrition consult as needed  - Instruct patient on self management of diabetes  Outcome: Progressing     Problem: Patient/Family Goals  Goal: Patient/Family Long Term Goal  Description: Patient's Long Term Goal: Hip free of infection    Interventions:  - Scheduled surgery/ irrigation & debridement  - Antibiotics  - Keep surgical area clean  - See additional Care Plan goals for specific interventions  Outcome: Progressing  Goal: Patient/Family Short Term Goal  Description: Patient's Short Term Goal: Control pain    Interventions:   - Medication  - Surgery  - See additional Care Plan goals for specific interventions  Outcome: Progressing

## 2023-03-10 NOTE — ANESTHESIA PROCEDURE NOTES
Airway  Date/Time: 3/10/2023 5:29 PM  Urgency: Elective    Difficult airway    General Information and Staff    Patient location during procedure: OR  Anesthesiologist: Kevin Valderrama MD  Performed: anesthesiologist   Performed by: Kevin Valderrama MD  Authorized by: Kevin Valderrama MD      Indications and Patient Condition  Indications for airway management: anesthesia  Spontaneous ventilation: present  Sedation level: deep  Preoxygenated: yes  Patient position: sniffing  Mask difficulty assessment: 1 - vent by mask    Final Airway Details  Final airway type: endotracheal airway      Successful airway: ETT  Cuffed: yes   Successful intubation technique: direct laryngoscopy  Endotracheal tube insertion site: oral  Blade: GlideScope  Blade size: #4  ETT size (mm): 7.5    Cormack-Lehane Classification: grade IIB - view of arytenoids or posterior of glottis only  Placement verified by: chest auscultation and capnometry   Measured from: teeth  ETT to teeth (cm): 24  Number of attempts at approach: 1

## 2023-03-11 LAB
ANION GAP SERPL CALC-SCNC: 8 MMOL/L (ref 0–18)
BUN BLD-MCNC: 28 MG/DL (ref 7–18)
BUN/CREAT SERPL: 18.4 (ref 10–20)
CALCIUM BLD-MCNC: 8.5 MG/DL (ref 8.5–10.1)
CHLORIDE SERPL-SCNC: 106 MMOL/L (ref 98–112)
CO2 SERPL-SCNC: 23 MMOL/L (ref 21–32)
CREAT BLD-MCNC: 1.52 MG/DL
GFR SERPLBLD BASED ON 1.73 SQ M-ARVRAT: 50 ML/MIN/1.73M2 (ref 60–?)
GLUCOSE BLD-MCNC: 245 MG/DL (ref 70–99)
GLUCOSE BLDC GLUCOMTR-MCNC: 199 MG/DL (ref 70–99)
GLUCOSE BLDC GLUCOMTR-MCNC: 215 MG/DL (ref 70–99)
GLUCOSE BLDC GLUCOMTR-MCNC: 219 MG/DL (ref 70–99)
GLUCOSE BLDC GLUCOMTR-MCNC: 243 MG/DL (ref 70–99)
HCT VFR BLD AUTO: 30.9 %
HGB BLD-MCNC: 9.5 G/DL
OSMOLALITY SERPL CALC.SUM OF ELEC: 298 MOSM/KG (ref 275–295)
POTASSIUM SERPL-SCNC: 5 MMOL/L (ref 3.5–5.1)
SODIUM SERPL-SCNC: 137 MMOL/L (ref 136–145)

## 2023-03-11 PROCEDURE — 99233 SBSQ HOSP IP/OBS HIGH 50: CPT | Performed by: HOSPITALIST

## 2023-03-11 NOTE — ANESTHESIA POSTPROCEDURE EVALUATION
Patient: Ascencion Bergeron    Procedure Summary     Date: 03/10/23 Room / Location: [address] [place] [address] MAIN OR 12 / [address] [place] [address] MAIN OR    Anesthesia Start: 4894 Anesthesia Stop: 1953    Procedure: LEFT HIP REVISION WITH HEADLINER EXCHANGE, IRRIGATION AND DEBRIDEMENT (Left: Hip) Diagnosis:       Left hip prosthetic joint infection ([address]      (Left hip prosthetic joint infection ([address] [F49.40PV])    Surgeons: Mario Nelson MD Anesthesiologist: Breanne Mir MD    Anesthesia Type: general ASA Status: 3          Anesthesia Type: general    Vitals Value Taken Time   /75 03/10/23 1953   Temp  03/10/23 1953   Pulse 68 03/10/23 1953   Resp 14 03/10/23 1953   SpO2 97 03/10/23 1953       [address] [place] [address] AN Post Evaluation:   Patient Evaluated in PACU  Level of Consciousness: awake  Pain Management: adequate  Airway Patency:patent  Yes    Cardiovascular Status: acceptable  Respiratory Status: acceptable  Postoperative Hydration acceptable      Bhargav Avila MD  3/10/2023 7:53 PM

## 2023-03-11 NOTE — PLAN OF CARE
Post-op day 1. Alert and oriented x4 on room air. Denies numbness/tingling. Voiding freely via urinal. Has not gotten out of bed yet. PRN benadryl given for itching. Reports having a poor experience with oxy in the past; contacted MD and obtained orders for PRN tramadol. Scheduled tylenol given as ordered. IV antibiotics given as ordered. Will need PICC placement, plan for Monday.      Problem: Patient Centered Care  Goal: Patient preferences are identified and integrated in the patient's plan of care  Description: Interventions:  - What would you like us to know as we care for you?  - Provide timely, complete, and accurate information to patient/family  - Incorporate patient and family knowledge, values, beliefs, and cultural backgrounds into the planning and delivery of care  - Encourage patient/family to participate in care and decision-making at the level they choose  - Honor patient and family perspectives and choices  Outcome: Progressing     Problem: Diabetes/Glucose Control  Goal: Glucose maintained within prescribed range  Description: INTERVENTIONS:  - Monitor Blood Glucose as ordered  - Assess for signs and symptoms of hyperglycemia and hypoglycemia  - Administer ordered medications to maintain glucose within target range  - Assess barriers to adequate nutritional intake and initiate nutrition consult as needed  - Instruct patient on self management of diabetes  Outcome: Progressing     Problem: Patient/Family Goals  Goal: Patient/Family Long Term Goal  Description: Patient's Long Term Goal:     Interventions:  -   - See additional Care Plan goals for specific interventions  Outcome: Progressing  Goal: Patient/Family Short Term Goal  Description: Patient's Short Term Goal:     Interventions:   -   - See additional Care Plan goals for specific interventions  Outcome: Progressing

## 2023-03-11 NOTE — ANESTHESIA PROCEDURE NOTES
Peripheral IV  Date/Time: 3/10/2023 6:00 PM  Inserted by: Lm Richard MD    Placement  Needle size: 18 G  Laterality: left  Location: hand  Local anesthetic: none  Site prep: alcohol  Technique: anatomical landmarks  Attempts: 1

## 2023-03-11 NOTE — OPERATIVE REPORT
[facility] [address]: 3/10/'2023    PREOPERATIVE DIAGNOSIS:   1. 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 Left Revision Total Hip Arthroplasty - Irrigation/Debridement and Modular Component Exchange (16148 - M 52 (Reduced Svcs))  2. Left hip debridement of pseudotumor, adverse local tissue reaction  3. Application of negative pressure incisional dressing    Location: [facility]    SURGEON: James Ochoa MD  Assistant(s): Mulu Bustos MD, Tomy Orellana MD    Anesthesia type: General  Estimated Blood Loss: 200cc    Drains: None    Complications: None immediately apparent    Explants:  58mm Trident II Eric Dual Mobility Acetabular Liner  28+3. 5/46mm dual mobility femoral Head for 12/14 taper (fitmore stem)    Implants:  Eric Trident II X3 0 degree [id number] acetabular Liner  28+3.5 biolox delta ceramic femoral 12/14 taper head with 44mm multipolar onelia bipolar head    Findings: No purulence. Taper corrosion behind the metal liner and inside the cup. Specimen: Tissue cultures    Indication: This is a 78year old man, who unfortunately sustained presented with hip pain with elevated inflammatory markers and aspiration results consistent with prosthetic joint infection. Surgical versus non-surgical options were discussed with the patient, and together we decided it is best to proceed with a revision total hip arthroplasty, irrigation/debridement with modular component exchange with the goals of decreasing infection burden and hopefully complete eradication.  All risks and benefits of surgery including bleeding, persistent infection, dislocation, shai-prosthetic fracture, neurovascular injury, leg length or offset discrepancy, as well as medical and anesthesia-related complications were discussed with the patient / family, who elected to proceed with surgery. Procedure in detail:    Following induction of anesthesia, the patient was positioned lateral decubitus on a peg board positioner. Perioperative IV antibiotics of Ancef were administered. Standard prep and drape was performed. A longitudinal incision was made over the posterolateral aspect of the greater trochanter using the prior scar. Hypertrophic areas were excised. Cautery was used to dissect down to the fascia. A lanier was used to expose the fascia and electrocautery was used to incise the fascia in line with the femur. Hypertrophic bursal tissue was encountered which was debrided. Copious dark, black fluid was encountered deep to the fascia, some of which was aspirated and sent for culture. There was some scarring of the fascial layer to the deep tissues including capsule. Flaps were created deep to the fascia to facilitate later closure. A charnley retractor was placed. The posterior capsular scar was incised directly off of the posterior aspect of the greater trochanter distally, and proximally in line with the posterior border of the abductor. Upon entering the joint, 3 total tissue cultures were taken. The capsule along with any necrotic appearing tissue were debrided sharply circumferentially around the cup. The capsule was incised radially distally, and the hip was dislocated. The dual mobility head was disimpacted off of the trunnion using a plastic head impactor, to protect the trunnion and a mallet. The trunnion was well preserved. The stem was checked and noted to be well fixed and well positioned. The trunnion did not have significant corrosion. A bone hook was used to lever the femur anteriorly, some anterior capsular scar was excised, and the anterior acetabular retractor was placed over the anterior wall. The cup was cleared of soft tissue circumferentially.  The liner was removed using a 1/4 inch osteotome and a mallet. The back of the metal liner and the inside of the cup contained moderate amount of black substance and evidence of taper corrosion. The two screws were removed, and cup was assessed and noted to be stable. The wound was then thoroughly irrigated as follows: 3L NS, followed by bactisure irrigation, then dilute betadine/peroxide solution, followed by 3 liters of NS, then again, dilute betadine/peroxide and a final 3L of saline. Due to the high risk of instability, and prior dislocations, we had wanted to use a construct as stable as the dual mobility construct, however, we wanted to avoid a metal liner given risk of taper corrosion. We did not want to downsize to a 40mm head from a prior 46mm outer head. There were no 44mm heads available for this stem, and we did not wish to use a constrained liner. Therefore we opted for a tripolar construct with a 44mm outer bipolar head articulating with a 44mm ID poly. A 44mm poly liner was used. The liner was impacted into the cup and the tabs were fully seated. A 28+3.5 / 44mm bipolar head construct was trialed and noted to have excellent anterior and posterior stability. The final 28+3 ceramic head with titanium sleeve was mated with a 44mm outer bipolar head and were impacted onto a clean trunnion. The hip was reduced and noted to have excellent stability posteriorly. Excellent hemostasis was achieved. Two grams of vancomycin powder were deposited into the wound. The posterior capsule was repaired using #1 PDS suture to the soft tissue over the greater trochanter and the superior capsule. The fascia was closed with #2 quill, subcutaneous tissues closed with 0 monoderm quill, 2-0 monoderm quill and skin closed with 3-0 nylon suture. A sterile negative pressure dressing was applied. Sponge, needle and instrument counts were correct at the completion of the case. There were no immediate complications. The patient was transferred to the post-anesthesia recovery unit (PACU) in stable condition. There were no immediate complications. There was no qualified resident available to assist because qualified residents were assisting with other surgery. POST-OPERATIVE PLAN  1. The patient will be permitted weight bearing as tolerated on the operative extremity. 2. The patient will be placed on posterior hip precautions for 6 weeks. 3. The patient will be permitted hip range of motion as tolerated. 4. The patient will receive broad spectrum antibiotics, infectious disease team will be consulted and cultures will be followed. 5. The prevena dressing will be removed at 7 days postop and switched to a dry dressing. 6. Venous thromboembolic prophylaxis will consist of early mobilization, mechanical compressive devices, and lovenox to warfarin transition. 7. The patient should be scheduled for follow up in 2 weeks for wound and radiographic evaluation.

## 2023-03-12 LAB
ANION GAP SERPL CALC-SCNC: 7 MMOL/L (ref 0–18)
BUN BLD-MCNC: 24 MG/DL (ref 7–18)
BUN/CREAT SERPL: 20.5 (ref 10–20)
CALCIUM BLD-MCNC: 8.7 MG/DL (ref 8.5–10.1)
CHLORIDE SERPL-SCNC: 105 MMOL/L (ref 98–112)
CO2 SERPL-SCNC: 25 MMOL/L (ref 21–32)
CREAT BLD-MCNC: 1.17 MG/DL
GFR SERPLBLD BASED ON 1.73 SQ M-ARVRAT: 69 ML/MIN/1.73M2 (ref 60–?)
GLUCOSE BLD-MCNC: 206 MG/DL (ref 70–99)
GLUCOSE BLDC GLUCOMTR-MCNC: 185 MG/DL (ref 70–99)
GLUCOSE BLDC GLUCOMTR-MCNC: 196 MG/DL (ref 70–99)
GLUCOSE BLDC GLUCOMTR-MCNC: 214 MG/DL (ref 70–99)
GLUCOSE BLDC GLUCOMTR-MCNC: 251 MG/DL (ref 70–99)
OSMOLALITY SERPL CALC.SUM OF ELEC: 294 MOSM/KG (ref 275–295)
POTASSIUM SERPL-SCNC: 3.8 MMOL/L (ref 3.5–5.1)
SODIUM SERPL-SCNC: 137 MMOL/L (ref 136–145)
VANCOMYCIN PEAK SERPL-MCNC: 29.5 UG/ML (ref 30–50)

## 2023-03-12 PROCEDURE — 99232 SBSQ HOSP IP/OBS MODERATE 35: CPT | Performed by: HOSPITALIST

## 2023-03-12 RX ORDER — AMLODIPINE BESYLATE 5 MG/1
5 TABLET ORAL DAILY
Status: DISCONTINUED | OUTPATIENT
Start: 2023-03-13 | End: 2023-03-14

## 2023-03-12 RX ORDER — POTASSIUM CHLORIDE 20 MEQ/1
40 TABLET, EXTENDED RELEASE ORAL ONCE
Status: COMPLETED | OUTPATIENT
Start: 2023-03-12 | End: 2023-03-12

## 2023-03-12 NOTE — PLAN OF CARE
Patient is alert and oriented. RA. Tele in place. Voiding freely. Up x1 with walker. WBAT. Dressing in place to left hip, CDI. IV abx. PRN tramadol and scheduled tylenol given for pain management. Call light within reach, bed alarm active. Problem: Patient Centered Care  Goal: Patient preferences are identified and integrated in the patient's plan of care  Description: Interventions:  - What would you like us to know as we care for you?  This is my 4th surgery on this hip.  - Provide timely, complete, and accurate information to patient/family  - Incorporate patient and family knowledge, values, beliefs, and cultural backgrounds into the planning and delivery of care  - Encourage patient/family to participate in care and decision-making at the level they choose  - Honor patient and family perspectives and choices  Outcome: Progressing     Problem: Diabetes/Glucose Control  Goal: Glucose maintained within prescribed range  Description: INTERVENTIONS:  - Monitor Blood Glucose as ordered  - Assess for signs and symptoms of hyperglycemia and hypoglycemia  - Administer ordered medications to maintain glucose within target range  - Assess barriers to adequate nutritional intake and initiate nutrition consult as needed  - Instruct patient on self management of diabetes  Outcome: Progressing     Problem: PAIN - ADULT  Goal: Verbalizes/displays adequate comfort level or patient's stated pain goal  Description: INTERVENTIONS:  - Encourage pt to monitor pain and request assistance  - Assess pain using appropriate pain scale  - Administer analgesics based on type and severity of pain and evaluate response  - Implement non-pharmacological measures as appropriate and evaluate response  - Consider cultural and social influences on pain and pain management  - Manage/alleviate anxiety  - Utilize distraction and/or relaxation techniques  - Monitor for opioid side effects  - Notify MD/LIP if interventions unsuccessful or patient reports new pain  - Anticipate increased pain with activity and pre-medicate as appropriate  Outcome: Progressing     Problem: RISK FOR INFECTION - ADULT  Goal: Absence of fever/infection during anticipated neutropenic period  Description: INTERVENTIONS  - Monitor WBC  - Administer growth factors as ordered  - Implement neutropenic guidelines  Outcome: Progressing     Problem: SAFETY ADULT - FALL  Goal: Free from fall injury  Description: INTERVENTIONS:  - Assess pt frequently for physical needs  - Identify cognitive and physical deficits and behaviors that affect risk of falls.   - [place] fall precautions as indicated by assessment.  - Educate pt/family on patient safety including physical limitations  - Instruct pt to call for assistance with activity based on assessment  - Modify environment to reduce risk of injury  - Provide assistive devices as appropriate  - Consider OT/PT consult to assist with strengthening/mobility  - Encourage toileting schedule  Outcome: Progressing     Problem: DISCHARGE PLANNING  Goal: Discharge to home or other facility with appropriate resources  Description: INTERVENTIONS:  - Identify barriers to discharge w/pt and caregiver  - Include patient/family/discharge partner in discharge planning  - Arrange for needed discharge resources and transportation as appropriate  - Identify discharge learning needs (meds, wound care, etc)  - Arrange for interpreters to assist at discharge as needed  - Consider post-discharge preferences of patient/family/discharge partner  - Complete POLST form as appropriate  - Assess patient's ability to be responsible for managing their own health  - Refer to Case Management Department for coordinating discharge planning if the patient needs post-hospital services based on physician/LIP order or complex needs related to functional status, cognitive ability or social support system  Outcome: Progressing

## 2023-03-12 NOTE — PLAN OF CARE
Pt is A&Ox4. RA. Remote tele. SCDs, teds and lovenox for dvt prophylaxis. Voiding freely. Last BM was 3/10. Pt reported constipation, mirlax given. Ice packs prn. PRN tramadol and scheduled tylenol for pain mangement. Up 1 with walker, WBAT LLE. General diet. Saline locked. IV cefepime and vanco. Abductor pillow. ACHS. L hip-aquacel. Plan for [address] and 6-8 IV abx. Needs PICC tomorrow. Problem: Patient Centered Care  Goal: Patient preferences are identified and integrated in the patient's plan of care  Description: Interventions:  - What would you like us to know as we care for you?  My wife is here  - Provide timely, complete, and accurate information to patient/family  - Incorporate patient and family knowledge, values, beliefs, and cultural backgrounds into the planning and delivery of care  - Encourage patient/family to participate in care and decision-making at the level they choose  - Honor patient and family perspectives and choices  Outcome: Progressing     Problem: Diabetes/Glucose Control  Goal: Glucose maintained within prescribed range  Description: INTERVENTIONS:  - Monitor Blood Glucose as ordered  - Assess for signs and symptoms of hyperglycemia and hypoglycemia  - Administer ordered medications to maintain glucose within target range  - Assess barriers to adequate nutritional intake and initiate nutrition consult as needed  - Instruct patient on self management of diabetes  Outcome: Progressing     Problem: Patient/Family Goals  Goal: Patient/Family Long Term Goal  Description: Patient's Long Term Goal: to go home without pain     Interventions:  - pain meds   - See additional Care Plan goals for specific interventions  Outcome: Progressing  Goal: Patient/Family Short Term Goal  Description: Patient's Short Term Goal: to go home     Interventions:   - be cleared medically   - See additional Care Plan goals for specific interventions  Outcome: Progressing     Problem: PAIN - ADULT  Goal: Verbalizes/displays adequate comfort level or patient's stated pain goal  Description: INTERVENTIONS:  - Encourage pt to monitor pain and request assistance  - Assess pain using appropriate pain scale  - Administer analgesics based on type and severity of pain and evaluate response  - Implement non-pharmacological measures as appropriate and evaluate response  - Consider cultural and social influences on pain and pain management  - Manage/alleviate anxiety  - Utilize distraction and/or relaxation techniques  - Monitor for opioid side effects  - Notify MD/LIP if interventions unsuccessful or patient reports new pain  - Anticipate increased pain with activity and pre-medicate as appropriate  Outcome: Progressing     Problem: RISK FOR INFECTION - ADULT  Goal: Absence of fever/infection during anticipated neutropenic period  Description: INTERVENTIONS  - Monitor WBC  - Administer growth factors as ordered  - Implement neutropenic guidelines  Outcome: Progressing     Problem: SAFETY ADULT - FALL  Goal: Free from fall injury  Description: INTERVENTIONS:  - Assess pt frequently for physical needs  - Identify cognitive and physical deficits and behaviors that affect risk of falls.   - [place] fall precautions as indicated by assessment.  - Educate pt/family on patient safety including physical limitations  - Instruct pt to call for assistance with activity based on assessment  - Modify environment to reduce risk of injury  - Provide assistive devices as appropriate  - Consider OT/PT consult to assist with strengthening/mobility  - Encourage toileting schedule  Outcome: Progressing     Problem: DISCHARGE PLANNING  Goal: Discharge to home or other facility with appropriate resources  Description: INTERVENTIONS:  - Identify barriers to discharge w/pt and caregiver  - Include patient/family/discharge partner in discharge planning  - Arrange for needed discharge resources and transportation as appropriate  - Identify discharge learning needs (meds, wound care, etc)  - Arrange for interpreters to assist at discharge as needed  - Consider post-discharge preferences of patient/family/discharge partner  - Complete POLST form as appropriate  - Assess patient's ability to be responsible for managing their own health  - Refer to Case Management Department for coordinating discharge planning if the patient needs post-hospital services based on physician/LIP order or complex needs related to functional status, cognitive ability or social support system  Outcome: Progressing

## 2023-03-12 NOTE — PLAN OF CARE
Pt is A&Ox4. RA. Remote tele. SCDs, teds and lovenox for dvt prophylaxis. Voiding freely. Last BM was 3/10. Ice packs. PRN tramadol and scheduled tylenol for pain manageement. 1 with JASON monsivais. L Hip-aqaucel. General diet. Saline locked. IV cefepime and vanco. Abductor pillow. ACHS. Plan for [address] and Iv abx 6-8 weeks. Needs PICC Monday. Problem: Patient Centered Care  Goal: Patient preferences are identified and integrated in the patient's plan of care  Description: Interventions:  - What would you like us to know as we care for you?  My wife is here  - Provide timely, complete, and accurate information to patient/family  - Incorporate patient and family knowledge, values, beliefs, and cultural backgrounds into the planning and delivery of care  - Encourage patient/family to participate in care and decision-making at the level they choose  - Honor patient and family perspectives and choices  Outcome: Progressing     Problem: Diabetes/Glucose Control  Goal: Glucose maintained within prescribed range  Description: INTERVENTIONS:  - Monitor Blood Glucose as ordered  - Assess for signs and symptoms of hyperglycemia and hypoglycemia  - Administer ordered medications to maintain glucose within target range  - Assess barriers to adequate nutritional intake and initiate nutrition consult as needed  - Instruct patient on self management of diabetes  Outcome: Progressing     Problem: Patient/Family Goals  Goal: Patient/Family Long Term Goal  Description: Patient's Long Term Goal: to go home without pain    Interventions:  - pain meds   - See additional Care Plan goals for specific interventions  Outcome: Progressing  Goal: Patient/Family Short Term Goal  Description: Patient's Short Term Goal: to go home    Interventions:   - be cleared medically   - See additional Care Plan goals for specific interventions  Outcome: Progressing

## 2023-03-13 ENCOUNTER — APPOINTMENT (OUTPATIENT)
Dept: PICC SERVICES | Facility: HOSPITAL | Age: 68
End: 2023-03-13
Attending: HOSPITALIST
Payer: COMMERCIAL

## 2023-03-13 LAB
CK SERPL-CCNC: 269 U/L
GLUCOSE BLDC GLUCOMTR-MCNC: 185 MG/DL (ref 70–99)
GLUCOSE BLDC GLUCOMTR-MCNC: 199 MG/DL (ref 70–99)
GLUCOSE BLDC GLUCOMTR-MCNC: 213 MG/DL (ref 70–99)
GLUCOSE BLDC GLUCOMTR-MCNC: 292 MG/DL (ref 70–99)
INR BLD: 1.04 (ref 0.85–1.16)
POTASSIUM SERPL-SCNC: 4.5 MMOL/L (ref 3.5–5.1)
PROTHROMBIN TIME: 13.5 SECONDS (ref 11.6–14.8)
VANCOMYCIN TROUGH SERPL-MCNC: 13.5 UG/ML (ref 10–20)

## 2023-03-13 PROCEDURE — 99232 SBSQ HOSP IP/OBS MODERATE 35: CPT | Performed by: HOSPITALIST

## 2023-03-13 PROCEDURE — 02HV33Z INSERTION OF INFUSION DEVICE INTO SUPERIOR VENA CAVA, PERCUTANEOUS APPROACH: ICD-10-PCS | Performed by: HOSPITALIST

## 2023-03-13 RX ORDER — CEFTRIAXONE SODIUM 2 G/50ML
2 INJECTION, SOLUTION INTRAVENOUS EVERY 24 HOURS
Qty: 1950 ML | Refills: 0 | Status: SHIPPED | OUTPATIENT
Start: 2023-03-13 | End: 2023-04-21

## 2023-03-13 RX ORDER — WARFARIN SODIUM 5 MG/1
5 TABLET ORAL NIGHTLY
Status: DISCONTINUED | OUTPATIENT
Start: 2023-03-13 | End: 2023-03-14

## 2023-03-13 NOTE — PLAN OF CARE
POD#3. A&Ox4. RA. On tele. Teds, SCDs, and lovenox for DVT prophylaxis. Voiding with urinal. Pain managed with tylenol and tramadol. ACHS. Up x1 assist with walker. Plan is [facility] pending PICC placement for IV antibiotics today. Safety measures put into place and call light within reach.    Problem: Patient Centered Care  Goal: Patient preferences are identified and integrated in the patient's plan of care  Description: Interventions:  - What would you like us to know as we care for you?   - Provide timely, complete, and accurate information to patient/family  - Incorporate patient and family knowledge, values, beliefs, and cultural backgrounds into the planning and delivery of care  - Encourage patient/family to participate in care and decision-making at the level they choose  - Honor patient and family perspectives and choices  Outcome: Progressing     Problem: Diabetes/Glucose Control  Goal: Glucose maintained within prescribed range  Description: INTERVENTIONS:  - Monitor Blood Glucose as ordered  - Assess for signs and symptoms of hyperglycemia and hypoglycemia  - Administer ordered medications to maintain glucose within target range  - Assess barriers to adequate nutritional intake and initiate nutrition consult as needed  - Instruct patient on self management of diabetes  Outcome: Progressing       Problem: PAIN - ADULT  Goal: Verbalizes/displays adequate comfort level or patient's stated pain goal  Description: INTERVENTIONS:  - Encourage pt to monitor pain and request assistance  - Assess pain using appropriate pain scale  - Administer analgesics based on type and severity of pain and evaluate response  - Implement non-pharmacological measures as appropriate and evaluate response  - Consider cultural and social influences on pain and pain management  - Manage/alleviate anxiety  - Utilize distraction and/or relaxation techniques  - Monitor for opioid side effects  - Notify MD/LIP if interventions unsuccessful or patient reports new pain  - Anticipate increased pain with activity and pre-medicate as appropriate  Outcome: Progressing     Problem: RISK FOR INFECTION - ADULT  Goal: Absence of fever/infection during anticipated neutropenic period  Description: INTERVENTIONS  - Monitor WBC  - Administer growth factors as ordered  - Implement neutropenic guidelines  Outcome: Progressing     Problem: SAFETY ADULT - FALL  Goal: Free from fall injury  Description: INTERVENTIONS:  - Assess pt frequently for physical needs  - Identify cognitive and physical deficits and behaviors that affect risk of falls.   - [place] fall precautions as indicated by assessment.  - Educate pt/family on patient safety including physical limitations  - Instruct pt to call for assistance with activity based on assessment  - Modify environment to reduce risk of injury  - Provide assistive devices as appropriate  - Consider OT/PT consult to assist with strengthening/mobility  - Encourage toileting schedule  Outcome: Progressing     Problem: DISCHARGE PLANNING  Goal: Discharge to home or other facility with appropriate resources  Description: INTERVENTIONS:  - Identify barriers to discharge w/pt and caregiver  - Include patient/family/discharge partner in discharge planning  - Arrange for needed discharge resources and transportation as appropriate  - Identify discharge learning needs (meds, wound care, etc)  - Arrange for interpreters to assist at discharge as needed  - Consider post-discharge preferences of patient/family/discharge partner  - Complete POLST form as appropriate  - Assess patient's ability to be responsible for managing their own health  - Refer to Case Management Department for coordinating discharge planning if the patient needs post-hospital services based on physician/LIP order or complex needs related to functional status, cognitive ability or social support system  Outcome: Progressing

## 2023-03-13 NOTE — PLAN OF CARE
Problem: Patient Centered Care  Goal: Patient preferences are identified and integrated in the patient's plan of care  Description: Interventions:  - What would you like us to know as we care for you? Patient is from home with his wife and she's his support system. - Provide timely, complete, and accurate information to patient/family  - Incorporate patient and family knowledge, values, beliefs, and cultural backgrounds into the planning and delivery of care  - Encourage patient/family to participate in care and decision-making at the level they choose  - Honor patient and family perspectives and choices  Outcome: Progressing     Problem: Diabetes/Glucose Control  Goal: Glucose maintained within prescribed range  Description: INTERVENTIONS:  - Monitor Blood Glucose as ordered  - Assess for signs and symptoms of hyperglycemia and hypoglycemia  - Administer ordered medications to maintain glucose within target range  - Assess barriers to adequate nutritional intake and initiate nutrition consult as needed  - Instruct patient on self management of diabetes  Outcome: Progressing     Problem: Patient/Family Goals  Goal: Patient/Family Long Term Goal  Description: Patient's Long Term Goal: Patient will not develop any infection from his revised hip and will not have any complication from surgery. Interventions:  - Up walking using a walker, WBAT to left leg.  - Pain management with oral medication.  - Lovenox subcutaneous for DVt prophylaxis. - Monitor incision for any signs of infection or bleeding.  - May use ice to incision to prevent swelling or reduce pain. - PICC line care. - Long term IVABT. - Labs as ordered. - See additional Care Plan goals for specific interventions  Outcome: Progressing  Goal: Patient/Family Short Term Goal  Description: Patient's Short Term Goal: Home when stable and IV abt are arranged.     Interventions:   - Up walking using a walker, WBAT to left leg.  - Pain management with oral medication.  - SCD's/JODEE hose to both legs/Lovenox subcutaneous for DVt prophylaxis. - Monitor incision for any signs of infection or bleeding.  - May use ice to incision to prevent swelling or reduce pain. - PICC line care. - Long term IVABT. - Labs and VS as ordered. - See additional Care Plan goals for specific interventions  Outcome: Progressing     Problem: PAIN - ADULT  Goal: Verbalizes/displays adequate comfort level or patient's stated pain goal  Description: INTERVENTIONS:  - Encourage pt to monitor pain and request assistance  - Assess pain using appropriate pain scale  - Administer analgesics based on type and severity of pain and evaluate response  - Implement non-pharmacological measures as appropriate and evaluate response  - Consider cultural and social influences on pain and pain management  - Manage/alleviate anxiety  - Utilize distraction and/or relaxation techniques  - Monitor for opioid side effects  - Notify MD/LIP if interventions unsuccessful or patient reports new pain  - Anticipate increased pain with activity and pre-medicate as appropriate  Outcome: Progressing     Problem: RISK FOR INFECTION - ADULT  Goal: Absence of fever/infection during anticipated neutropenic period  Description: INTERVENTIONS  - Monitor WBC  - Administer growth factors as ordered  - Implement neutropenic guidelines  Outcome: Progressing     Problem: SAFETY ADULT - FALL  Goal: Free from fall injury  Description: INTERVENTIONS:  - Assess pt frequently for physical needs  - Identify cognitive and physical deficits and behaviors that affect risk of falls.   - [place] fall precautions as indicated by assessment.  - Educate pt/family on patient safety including physical limitations  - Instruct pt to call for assistance with activity based on assessment  - Modify environment to reduce risk of injury  - Provide assistive devices as appropriate  - Consider OT/PT consult to assist with strengthening/mobility  - Encourage toileting schedule  Outcome: Progressing     Problem: DISCHARGE PLANNING  Goal: Discharge to home or other facility with appropriate resources  Description: INTERVENTIONS:  - Identify barriers to discharge w/pt and caregiver  - Include patient/family/discharge partner in discharge planning  - Arrange for needed discharge resources and transportation as appropriate  - Identify discharge learning needs (meds, wound care, etc)  - Arrange for interpreters to assist at discharge as needed  - Consider post-discharge preferences of patient/family/discharge partner  - Complete POLST form as appropriate  - Assess patient's ability to be responsible for managing their own health  - Refer to Case Management Department for coordinating discharge planning if the patient needs post-hospital services based on physician/LIP order or complex needs related to functional status, cognitive ability or social support system  Outcome: Progressing     Problem: METABOLIC/FLUID AND ELECTROLYTES - ADULT  Goal: Glucose maintained within prescribed range  Description: INTERVENTIONS:  - Monitor Blood Glucose as ordered  - Assess for signs and symptoms of hyperglycemia and hypoglycemia  - Administer ordered medications to maintain glucose within target range  - Assess barriers to adequate nutritional intake and initiate nutrition consult as needed  - Instruct patient on self management of diabetes  Outcome: Progressing  Goal: Electrolytes maintained within normal limits  Description: INTERVENTIONS:  - Monitor labs and rhythm and assess patient for signs and symptoms of electrolyte imbalances  - Administer electrolyte replacement as ordered  - Monitor response to electrolyte replacements, including rhythm and repeat lab results as appropriate  - Fluid restriction as ordered  - Instruct patient on fluid and nutrition restrictions as appropriate  Outcome: Progressing  Goal: Hemodynamic stability and optimal renal function maintained  Description: INTERVENTIONS:  - Monitor labs and assess for signs and symptoms of volume excess or deficit  - Monitor intake, output and patient weight  - Monitor urine specific gravity, serum osmolarity and serum sodium as indicated or ordered  - Monitor response to interventions for patient's volume status, including labs, urine output, blood pressure (other measures as available)  - Encourage oral intake as appropriate  - Instruct patient on fluid and nutrition restrictions as appropriate  Outcome: Progressing     Problem: SKIN/TISSUE INTEGRITY - ADULT  Goal: Incision(s), wounds(s) or drain site(s) healing without S/S of infection  Description: INTERVENTIONS:  - Assess and document risk factors for pressure ulcer development  - Assess and document skin integrity  - Assess and document dressing/incision, wound bed, drain sites and surrounding tissue  - Implement wound care per orders  - Initiate isolation precautions as appropriate  - Initiate Pressure Ulcer prevention bundle as indicated  Outcome: Progressing     Problem: MUSCULOSKELETAL - ADULT  Goal: Return mobility to safest level of function  Description: INTERVENTIONS:  - Assess patient stability and activity tolerance for standing, transferring and ambulating w/ or w/o assistive devices  - Assist with transfers and ambulation using safe patient handling equipment as needed  - Ensure adequate protection for wounds/incisions during mobilization  - Obtain PT/OT consults as needed  - Advance activity as appropriate  - Communicate ordered activity level and limitations with patient/family  Outcome: Progressing  Goal: Maintain proper alignment of affected body part  Description: INTERVENTIONS:  - Support and protect limb and body alignment per provider's orders  - Instruct and reinforce with patient and family use of appropriate assistive device and precautions (e.g. spinal or hip dislocation precautions)  Outcome: Progressing    Patient is alert and oriented, aware to call for help as needed. Patient is currently in room air, denies shortness of breathing nor chest pain. Patient is up with walker in room and hallway. Patient's pain is managed with oral medication. Patient is voiding well, passing gas but denies having a bowel movement. Patient will be going home once IV antibiotics are finalized.

## 2023-03-13 NOTE — CM/SW NOTE
ROSY followed up on DC planning. ROSY spoke with Erin from [facility] who confirmed pt is only approved for IOI however pt will need Kennedy Aguilar upon DC. [facility] cannot provide in the home     [facility] still pending and authorization pending as well. [facility] accepted for Kennedy Aguilar RN and PT. Final scripts entered. [place] from [facility] stating she will update SW once benefits are approved     Addendum, 3/14/2023  Insurance: [employer] [id number]   Deductible: $1,500.00  Deductible Amount Met: $0.00 met to date  Deductible: Once met, coverage @ 80%  Out of Pocket: $3,500.00  Out of Pocket Amount Met: $337.74 met to date  Out of Pocket: Once met, coverage @ 100%    Therapy/Orders Quoted: [id number]  $513.26 per week (DED/OOP not met yet)    $652.05 for 7 days -dapto copay    for both drugs: $1,165. 31 wkly cost     Other option is for pt to go into the office $50/day at [facility] either at [facility] or [place] office. Pt cannot come to [address] [place] [address] as they do not accept [employer]. UPDATE:   SW received call from spouse stating after talking with the insurance, insurance stating copay for IV ABx is only 25 dollars a day. [facility] confirmed above stating that is their quote as well. [facility] stating [facility] can [facility] still for Kennedy Aguilar RN and PT    GL Kennedy Aguilar reserved, notifed about seeing pt tomorrow and that [facility] is the company for pharmacy     [facility] confirmed they can deliver medication today     [facility]:  [address] [facility]  [facility], [address]  Phone: [phone]  Fax: [phone]    [address]  [place], [address] [facility]  Phone: [phone]  Fax: [phone]    Pt can discharge once doses are provided today    PLAN: Home with [address], LSW, MSW ext.  [phone]

## 2023-03-13 NOTE — HOME CARE LIAISON
Received referral via [employer] for Home Health services. Spoke w/ patient's spouse and discussed [facility] of [facility] providers from [address] [place], [place] [address], choice is [address] [facility]. Agency reserved in [address] [place] [address] and contact information placed on AVS.Financial interest disclosure provided.

## 2023-03-14 VITALS
HEART RATE: 84 BPM | DIASTOLIC BLOOD PRESSURE: 85 MMHG | SYSTOLIC BLOOD PRESSURE: 165 MMHG | BODY MASS INDEX: 37.14 KG/M2 | RESPIRATION RATE: 18 BRPM | HEIGHT: 67 IN | OXYGEN SATURATION: 93 % | WEIGHT: 236.63 LBS | TEMPERATURE: 98 F

## 2023-03-14 LAB
CHROMIUM, SERUM: 1.2 UG/L
COBALT, SERUM OR PLASMA: 1.2 UG/L
GLUCOSE BLDC GLUCOMTR-MCNC: 218 MG/DL (ref 70–99)
GLUCOSE BLDC GLUCOMTR-MCNC: 245 MG/DL (ref 70–99)

## 2023-03-14 PROCEDURE — 99239 HOSP IP/OBS DSCHRG MGMT >30: CPT | Performed by: HOSPITALIST

## 2023-03-14 NOTE — PLAN OF CARE
Problem: Patient Centered Care  Goal: Patient preferences are identified and integrated in the patient's plan of care  Description: Interventions:  - What would you like us to know as we care for you? Patient is from home with his wife and she's his support system. - Provide timely, complete, and accurate information to patient/family  - Incorporate patient and family knowledge, values, beliefs, and cultural backgrounds into the planning and delivery of care  - Encourage patient/family to participate in care and decision-making at the level they choose  - Honor patient and family perspectives and choices  Outcome: Adequate for Discharge     Problem: Diabetes/Glucose Control  Goal: Glucose maintained within prescribed range  Description: INTERVENTIONS:  - Monitor Blood Glucose as ordered  - Assess for signs and symptoms of hyperglycemia and hypoglycemia  - Administer ordered medications to maintain glucose within target range  - Assess barriers to adequate nutritional intake and initiate nutrition consult as needed  - Instruct patient on self management of diabetes  Outcome: Adequate for Discharge     Problem: Patient/Family Goals  Goal: Patient/Family Long Term Goal  Description: Patient's Long Term Goal: Patient will not develop any infection from his revised hip and will not have any complication from surgery. Interventions:  - Up walking using a walker, WBAT to left leg.  - Pain management with oral medication.  - Lovenox subcutaneous for DVt prophylaxis. - Monitor incision for any signs of infection or bleeding.  - May use ice to incision to prevent swelling or reduce pain. - PICC line care. - Long term IVABT. - Labs as ordered. - See additional Care Plan goals for specific interventions  Outcome: Adequate for Discharge  Goal: Patient/Family Short Term Goal  Description: Patient's Short Term Goal: Home when stable and IV abt are arranged.     Interventions:   - Up walking using a walker, WBAT to left leg.  - Pain management with oral medication.  - SCD's/JODEE hose to both legs/Lovenox subcutaneous for DVt prophylaxis. - Monitor incision for any signs of infection or bleeding.  - May use ice to incision to prevent swelling or reduce pain. - PICC line care. - Long term IVABT. - Labs and VS as ordered. - See additional Care Plan goals for specific interventions  Outcome: Adequate for Discharge     Problem: PAIN - ADULT  Goal: Verbalizes/displays adequate comfort level or patient's stated pain goal  Description: INTERVENTIONS:  - Encourage pt to monitor pain and request assistance  - Assess pain using appropriate pain scale  - Administer analgesics based on type and severity of pain and evaluate response  - Implement non-pharmacological measures as appropriate and evaluate response  - Consider cultural and social influences on pain and pain management  - Manage/alleviate anxiety  - Utilize distraction and/or relaxation techniques  - Monitor for opioid side effects  - Notify MD/LIP if interventions unsuccessful or patient reports new pain  - Anticipate increased pain with activity and pre-medicate as appropriate  Outcome: Adequate for Discharge     Problem: RISK FOR INFECTION - ADULT  Goal: Absence of fever/infection during anticipated neutropenic period  Description: INTERVENTIONS  - Monitor WBC  - Administer growth factors as ordered  - Implement neutropenic guidelines  Outcome: Adequate for Discharge     Problem: SAFETY ADULT - FALL  Goal: Free from fall injury  Description: INTERVENTIONS:  - Assess pt frequently for physical needs  - Identify cognitive and physical deficits and behaviors that affect risk of falls.   - [place] fall precautions as indicated by assessment.  - Educate pt/family on patient safety including physical limitations  - Instruct pt to call for assistance with activity based on assessment  - Modify environment to reduce risk of injury  - Provide assistive devices as appropriate  - Consider OT/PT consult to assist with strengthening/mobility  - Encourage toileting schedule  Outcome: Adequate for Discharge     Problem: DISCHARGE PLANNING  Goal: Discharge to home or other facility with appropriate resources  Description: INTERVENTIONS:  - Identify barriers to discharge w/pt and caregiver  - Include patient/family/discharge partner in discharge planning  - Arrange for needed discharge resources and transportation as appropriate  - Identify discharge learning needs (meds, wound care, etc)  - Arrange for interpreters to assist at discharge as needed  - Consider post-discharge preferences of patient/family/discharge partner  - Complete POLST form as appropriate  - Assess patient's ability to be responsible for managing their own health  - Refer to Case Management Department for coordinating discharge planning if the patient needs post-hospital services based on physician/LIP order or complex needs related to functional status, cognitive ability or social support system  Outcome: Adequate for Discharge     Problem: METABOLIC/FLUID AND ELECTROLYTES - ADULT  Goal: Glucose maintained within prescribed range  Description: INTERVENTIONS:  - Monitor Blood Glucose as ordered  - Assess for signs and symptoms of hyperglycemia and hypoglycemia  - Administer ordered medications to maintain glucose within target range  - Assess barriers to adequate nutritional intake and initiate nutrition consult as needed  - Instruct patient on self management of diabetes  Outcome: Adequate for Discharge  Goal: Electrolytes maintained within normal limits  Description: INTERVENTIONS:  - Monitor labs and rhythm and assess patient for signs and symptoms of electrolyte imbalances  - Administer electrolyte replacement as ordered  - Monitor response to electrolyte replacements, including rhythm and repeat lab results as appropriate  - Fluid restriction as ordered  - Instruct patient on fluid and nutrition restrictions as appropriate  Outcome: Adequate for Discharge  Goal: Hemodynamic stability and optimal renal function maintained  Description: INTERVENTIONS:  - Monitor labs and assess for signs and symptoms of volume excess or deficit  - Monitor intake, output and patient weight  - Monitor urine specific gravity, serum osmolarity and serum sodium as indicated or ordered  - Monitor response to interventions for patient's volume status, including labs, urine output, blood pressure (other measures as available)  - Encourage oral intake as appropriate  - Instruct patient on fluid and nutrition restrictions as appropriate  Outcome: Adequate for Discharge     Problem: SKIN/TISSUE INTEGRITY - ADULT  Goal: Incision(s), wounds(s) or drain site(s) healing without S/S of infection  Description: INTERVENTIONS:  - Assess and document risk factors for pressure ulcer development  - Assess and document skin integrity  - Assess and document dressing/incision, wound bed, drain sites and surrounding tissue  - Implement wound care per orders  - Initiate isolation precautions as appropriate  - Initiate Pressure Ulcer prevention bundle as indicated  Outcome: Adequate for Discharge     Problem: MUSCULOSKELETAL - ADULT  Goal: Return mobility to safest level of function  Description: INTERVENTIONS:  - Assess patient stability and activity tolerance for standing, transferring and ambulating w/ or w/o assistive devices  - Assist with transfers and ambulation using safe patient handling equipment as needed  - Ensure adequate protection for wounds/incisions during mobilization  - Obtain PT/OT consults as needed  - Advance activity as appropriate  - Communicate ordered activity level and limitations with patient/family  Outcome: Adequate for Discharge  Goal: Maintain proper alignment of affected body part  Description: INTERVENTIONS:  - Support and protect limb and body alignment per provider's orders  - Instruct and reinforce with patient and family use of appropriate assistive device and precautions (e.g. spinal or hip dislocation precautions)  Outcome: Adequate for Discharge    Patient is alert and oriented, aware to call for help as needed. Patient is currently in room air, denies shortness of breathing nor chest pain. Patient is up with minimal supervision from staff using a walker. Refuses to use his abductor splint, aware of hip precautions. Patient is voiding well, passing gas and had a bowel movement yesterday. Patient will be going home once antibiotic set up is done.

## 2023-03-14 NOTE — PLAN OF CARE
Pt is A&Ox4. RA. Left arm PICC line. Saline locked. Remote tele. Cumadin/lovenox & scds for dvt prophylaxis. Tolerating general diet. PRN tramadol for pain & scheduled tylenol. Voiding freely. ACHS. Plan when medically cleared long term abtx. Up by 1 assist with a walker. Call light within reach, frequent rounding. Safety measures in place. Problem: Patient Centered Care  Goal: Patient preferences are identified and integrated in the patient's plan of care  Description: Interventions:  - What would you like us to know as we care for you? Patient is from home with his wife and she's his support system. - Provide timely, complete, and accurate information to patient/family  - Incorporate patient and family knowledge, values, beliefs, and cultural backgrounds into the planning and delivery of care  - Encourage patient/family to participate in care and decision-making at the level they choose  - Honor patient and family perspectives and choices  Outcome: Progressing     Problem: Diabetes/Glucose Control  Goal: Glucose maintained within prescribed range  Description: INTERVENTIONS:  - Monitor Blood Glucose as ordered  - Assess for signs and symptoms of hyperglycemia and hypoglycemia  - Administer ordered medications to maintain glucose within target range  - Assess barriers to adequate nutritional intake and initiate nutrition consult as needed  - Instruct patient on self management of diabetes  Outcome: Progressing     Problem: Patient/Family Goals  Goal: Patient/Family Long Term Goal  Description: Patient's Long Term Goal: Patient will not develop any infection from his revised hip and will not have any complication from surgery. Interventions:  - Up walking using a walker, WBAT to left leg.  - Pain management with oral medication.  - Lovenox subcutaneous for DVt prophylaxis. - Monitor incision for any signs of infection or bleeding.  - May use ice to incision to prevent swelling or reduce pain.   - PICC line care.  - Long term IVABT. - Labs as ordered. - See additional Care Plan goals for specific interventions  Outcome: Progressing  Goal: Patient/Family Short Term Goal  Description: Patient's Short Term Goal: Home when stable and IV abt are arranged. Interventions:   - Up walking using a walker, WBAT to left leg.  - Pain management with oral medication.  - SCD's/JODEE hose to both legs/Lovenox subcutaneous for DVt prophylaxis. - Monitor incision for any signs of infection or bleeding.  - May use ice to incision to prevent swelling or reduce pain. - PICC line care. - Long term IVABT. - Labs and VS as ordered. - See additional Care Plan goals for specific interventions  Outcome: Progressing     Problem: PAIN - ADULT  Goal: Verbalizes/displays adequate comfort level or patient's stated pain goal  Description: INTERVENTIONS:  - Encourage pt to monitor pain and request assistance  - Assess pain using appropriate pain scale  - Administer analgesics based on type and severity of pain and evaluate response  - Implement non-pharmacological measures as appropriate and evaluate response  - Consider cultural and social influences on pain and pain management  - Manage/alleviate anxiety  - Utilize distraction and/or relaxation techniques  - Monitor for opioid side effects  - Notify MD/LIP if interventions unsuccessful or patient reports new pain  - Anticipate increased pain with activity and pre-medicate as appropriate  Outcome: Progressing     Problem: RISK FOR INFECTION - ADULT  Goal: Absence of fever/infection during anticipated neutropenic period  Description: INTERVENTIONS  - Monitor WBC  - Administer growth factors as ordered  - Implement neutropenic guidelines  Outcome: Progressing     Problem: SAFETY ADULT - FALL  Goal: Free from fall injury  Description: INTERVENTIONS:  - Assess pt frequently for physical needs  - Identify cognitive and physical deficits and behaviors that affect risk of falls.   - [place] fall precautions as indicated by assessment.  - Educate pt/family on patient safety including physical limitations  - Instruct pt to call for assistance with activity based on assessment  - Modify environment to reduce risk of injury  - Provide assistive devices as appropriate  - Consider OT/PT consult to assist with strengthening/mobility  - Encourage toileting schedule  Outcome: Progressing     Problem: DISCHARGE PLANNING  Goal: Discharge to home or other facility with appropriate resources  Description: INTERVENTIONS:  - Identify barriers to discharge w/pt and caregiver  - Include patient/family/discharge partner in discharge planning  - Arrange for needed discharge resources and transportation as appropriate  - Identify discharge learning needs (meds, wound care, etc)  - Arrange for interpreters to assist at discharge as needed  - Consider post-discharge preferences of patient/family/discharge partner  - Complete POLST form as appropriate  - Assess patient's ability to be responsible for managing their own health  - Refer to Case Management Department for coordinating discharge planning if the patient needs post-hospital services based on physician/LIP order or complex needs related to functional status, cognitive ability or social support system  Outcome: Progressing     Problem: METABOLIC/FLUID AND ELECTROLYTES - ADULT  Goal: Glucose maintained within prescribed range  Description: INTERVENTIONS:  - Monitor Blood Glucose as ordered  - Assess for signs and symptoms of hyperglycemia and hypoglycemia  - Administer ordered medications to maintain glucose within target range  - Assess barriers to adequate nutritional intake and initiate nutrition consult as needed  - Instruct patient on self management of diabetes  Outcome: Progressing  Goal: Electrolytes maintained within normal limits  Description: INTERVENTIONS:  - Monitor labs and rhythm and assess patient for signs and symptoms of electrolyte imbalances  - Administer electrolyte replacement as ordered  - Monitor response to electrolyte replacements, including rhythm and repeat lab results as appropriate  - Fluid restriction as ordered  - Instruct patient on fluid and nutrition restrictions as appropriate  Outcome: Progressing  Goal: Hemodynamic stability and optimal renal function maintained  Description: INTERVENTIONS:  - Monitor labs and assess for signs and symptoms of volume excess or deficit  - Monitor intake, output and patient weight  - Monitor urine specific gravity, serum osmolarity and serum sodium as indicated or ordered  - Monitor response to interventions for patient's volume status, including labs, urine output, blood pressure (other measures as available)  - Encourage oral intake as appropriate  - Instruct patient on fluid and nutrition restrictions as appropriate  Outcome: Progressing     Problem: SKIN/TISSUE INTEGRITY - ADULT  Goal: Incision(s), wounds(s) or drain site(s) healing without S/S of infection  Description: INTERVENTIONS:  - Assess and document risk factors for pressure ulcer development  - Assess and document skin integrity  - Assess and document dressing/incision, wound bed, drain sites and surrounding tissue  - Implement wound care per orders  - Initiate isolation precautions as appropriate  - Initiate Pressure Ulcer prevention bundle as indicated  Outcome: Progressing     Problem: MUSCULOSKELETAL - ADULT  Goal: Return mobility to safest level of function  Description: INTERVENTIONS:  - Assess patient stability and activity tolerance for standing, transferring and ambulating w/ or w/o assistive devices  - Assist with transfers and ambulation using safe patient handling equipment as needed  - Ensure adequate protection for wounds/incisions during mobilization  - Obtain PT/OT consults as needed  - Advance activity as appropriate  - Communicate ordered activity level and limitations with patient/family  Outcome: Progressing  Goal: Maintain proper alignment of affected body part  Description: INTERVENTIONS:  - Support and protect limb and body alignment per provider's orders  - Instruct and reinforce with patient and family use of appropriate assistive device and precautions (e.g. spinal or hip dislocation precautions)  Outcome: Progressing

## 2023-06-20 ENCOUNTER — TELEPHONE (OUTPATIENT)
Dept: NEUROLOGY | Facility: CLINIC | Age: 68
End: 2023-06-20

## 2023-07-25 ENCOUNTER — HOSPITAL ENCOUNTER (OUTPATIENT)
Dept: ULTRASOUND IMAGING | Facility: HOSPITAL | Age: 68
Discharge: HOME | End: 2023-07-25
Attending: INTERNAL MEDICINE
Payer: COMMERCIAL

## 2023-07-25 DIAGNOSIS — M79.604 BILATERAL LEG PAIN: ICD-10-CM

## 2023-07-25 DIAGNOSIS — M79.605 BILATERAL LEG PAIN: ICD-10-CM

## 2023-07-25 PROCEDURE — 93970 EXTREMITY STUDY: CPT | Performed by: INTERNAL MEDICINE

## 2023-07-26 ENCOUNTER — LAB ENCOUNTER (OUTPATIENT)
Dept: LAB | Age: 68
End: 2023-07-26
Attending: INTERNAL MEDICINE
Payer: COMMERCIAL

## 2023-07-26 DIAGNOSIS — T84.52XA INFECTION ASSOCIATED WITH INTERNAL LEFT HIP PROSTHESIS, INITIAL ENCOUNTER (HCC): ICD-10-CM

## 2023-07-26 LAB
BASOPHILS # BLD AUTO: 0.05 X10(3) UL (ref 0–0.2)
BASOPHILS NFR BLD AUTO: 0.7 %
DEPRECATED RDW RBC AUTO: 37.5 FL (ref 35.1–46.3)
EOSINOPHIL # BLD AUTO: 0.29 X10(3) UL (ref 0–0.7)
EOSINOPHIL NFR BLD AUTO: 4.3 %
ERYTHROCYTE [DISTWIDTH] IN BLOOD BY AUTOMATED COUNT: 16.5 % (ref 11–15)
HCT VFR BLD AUTO: 39.8 %
HGB BLD-MCNC: 12.3 G/DL
IMM GRANULOCYTES # BLD AUTO: 0.02 X10(3) UL (ref 0–1)
IMM GRANULOCYTES NFR BLD: 0.3 %
LYMPHOCYTES # BLD AUTO: 1.61 X10(3) UL (ref 1–4)
LYMPHOCYTES NFR BLD AUTO: 23.9 %
MCH RBC QN AUTO: 20.8 PG (ref 26–34)
MCHC RBC AUTO-ENTMCNC: 30.9 G/DL (ref 31–37)
MCV RBC AUTO: 67.2 FL
MONOCYTES # BLD AUTO: 0.57 X10(3) UL (ref 0.1–1)
MONOCYTES NFR BLD AUTO: 8.5 %
NEUTROPHILS # BLD AUTO: 4.19 X10 (3) UL (ref 1.5–7.7)
NEUTROPHILS # BLD AUTO: 4.19 X10(3) UL (ref 1.5–7.7)
NEUTROPHILS NFR BLD AUTO: 62.3 %
PLATELET # BLD AUTO: 206 10(3)UL (ref 150–450)
RBC # BLD AUTO: 5.92 X10(6)UL
WBC # BLD AUTO: 6.7 X10(3) UL (ref 4–11)

## 2023-07-26 PROCEDURE — 36415 COLL VENOUS BLD VENIPUNCTURE: CPT

## 2023-07-26 PROCEDURE — 85025 COMPLETE CBC W/AUTO DIFF WBC: CPT

## 2023-12-16 ENCOUNTER — HOSPITAL ENCOUNTER (OUTPATIENT)
Dept: MRI IMAGING | Age: 68
Discharge: HOME | End: 2023-12-16
Attending: PHYSICAL MEDICINE & REHABILITATION
Payer: COMMERCIAL

## 2023-12-16 DIAGNOSIS — M47.816 LUMBAR SPONDYLOSIS: ICD-10-CM

## 2023-12-16 PROCEDURE — 72148 MRI LUMBAR SPINE W/O DYE: CPT | Performed by: PHYSICAL MEDICINE & REHABILITATION

## (undated) DEVICE — SUT PDS II 0 CT-1 Z340H

## (undated) DEVICE — COVER TABLE BACK PADDED HVY DU

## (undated) DEVICE — SYRINGE 35ML LL TIP

## (undated) DEVICE — 20 ML SYRINGE LUER-LOCK TIP: Brand: MONOJECT

## (undated) DEVICE — SOLUTION SURG DURAPREP 26ML

## (undated) DEVICE — TOTAL HIP: Brand: MEDLINE INDUSTRIES, INC.

## (undated) DEVICE — SUT ETHIBOND 5-0 V-40 MB46G

## (undated) DEVICE — DRAPE SRG 70X60IN SPLT U IMPRV

## (undated) DEVICE — Device: Brand: STABLECUT®

## (undated) DEVICE — SOL NACL IRRIG 0.9% 1000ML BTL

## (undated) DEVICE — SOLUTION  .9 3000ML

## (undated) DEVICE — INTENDED FOR TISSUE SEPARATION, AND OTHER PROCEDURES THAT REQUIRE A SHARP SURGICAL BLADE TO PUNCTURE OR CUT.: Brand: BARD-PARKER ® STAINLESS STEEL BLADES

## (undated) DEVICE — DRAPE PACK CHEST & U BAR

## (undated) DEVICE — BNDG COHESIVE W4INXL5YD TAN E

## (undated) DEVICE — DRAPE SHEET LARGE 76X55

## (undated) DEVICE — DRESSING AQUACEL AG SP 3.5X12

## (undated) DEVICE — SUT PDS II 2-0 CT-1 Z339H

## (undated) DEVICE — PEN: MARKING STD PT 100/CS: Brand: MEDICAL ACTION INDUSTRIES

## (undated) DEVICE — 2DSM24 2-0 UND MONODERM 30X30: Brand: 2DSM24 2-0 UND MONODERM 30X30

## (undated) DEVICE — PILLOW ABDUCTION HIP MED

## (undated) DEVICE — BIPOLAR SEALER 23-112-1 AQM 6.0: Brand: AQUAMANTYS™

## (undated) DEVICE — 2T9 -0- UNDYED MONODERM 36X36: Brand: 2T9 -0- UNDYED MONODERM 36X36

## (undated) DEVICE — SUT VICRYL 2-0 CT-2 J269H

## (undated) DEVICE — 2T11 #2 PDO 36 X 36: Brand: 2T11 #2 PDO 36 X 36

## (undated) DEVICE — HOOD: Brand: FLYTE

## (undated) DEVICE — HOOD, PEEL-AWAY: Brand: FLYTE

## (undated) DEVICE — SUT PDS II 1 CTX Z371T

## (undated) DEVICE — SHEET,DRAPE,70X100,STERILE: Brand: MEDLINE

## (undated) DEVICE — GAMMEX® NON-LATEX PI ORTHO SIZE 8.5, STERILE POLYISOPRENE POWDER-FREE SURGICAL GLOVE: Brand: GAMMEX

## (undated) DEVICE — DRAPE CASSETTE X-RAY

## (undated) DEVICE — SUT ETHILON 3-0 FS-1 669H

## (undated) DEVICE — 60 ML SYRINGE LUER-LOCK TIP: Brand: MONOJECT

## (undated) DEVICE — APPLICATOR CHLORAPREP 26ML

## (undated) DEVICE — GAMMEX® PI HYBRID SIZE 8, STERILE POWDER-FREE SURGICAL GLOVE, POLYISOPRENE AND NEOPRENE BLEND: Brand: GAMMEX

## (undated) DEVICE — SUT VICRYL 1-0 CTX J977H

## (undated) DEVICE — KIT COLLECTION COPAN ESWAB 1ML

## (undated) DEVICE — SUT VICRYL 0 CT-1 J340H

## (undated) DEVICE — CONTAINER,SPECIMEN,OR STERILE,4OZ: Brand: MEDLINE

## (undated) DEVICE — NEEDLE SPINAL 18X3-1/2 PINK.

## (undated) DEVICE — TRAY SURESTEP 16 BARDEX DRAIN

## (undated) NOTE — LETTER
[address]  Authorization for Surgical Operation and Procedure                                                                                           1. I hereby authorize Cuba Mendoza MD, my physician and his/her assistants (if applicable), which may include medical students, residents, and/or fellows, to perform the following surgical operation/ procedure and administer such anesthesia as may be determined necessary by my physician: Operation/Procedure name (s) [address] on [facility]   2. I recognize that during the surgical operation/procedure, unforeseen conditions may necessitate additional or different procedures than those listed above. I, therefore, further authorize and request that the above-named surgeon, assistants, or designees perform such procedures as are, in their judgment, necessary and desirable. 3.   My surgeon/physician has discussed prior to my surgery the potential benefits, risks and side effects of this procedure; the likelihood of achieving goals; and potential problems that might occur during recuperation. They also discussed reasonable alternatives to the procedure, including risks, benefits, and side effects related to the alternatives and risks related to not receiving this procedure. I have had all my questions answered and I acknowledge that no guarantee has been made as to the result that may be obtained. 4.   Should the need arise during my operation/procedure, which includes change of level of care prior to discharge, I also consent to the administration of blood and/or blood products. Further, I understand that despite careful testing and screening of blood or blood products by collecting agencies, I may still be subject to ill effects as a result of receiving a blood transfusion and/or blood products.   The following are some, but not all, of the potential risks that can occur: fever and allergic reactions, hemolytic reactions, transmission of diseases such as Hepatitis, AIDS and Cytomegalovirus (CMV) and fluid overload. In the event that I wish to have an autologous transfusion of my own blood, or a directed donor transfusion, I will discuss this with my physician. Check only if Refusing Blood or Blood Products  I understand refusal of blood or blood products as deemed necessary by my physician may have serious consequences to my condition to include possible death. I hereby assume responsibility for my refusal and release the hospital, its personnel, and my physicians from any responsibility for the consequences of my refusal.    o  Refuse   5. I authorize the use of any specimen, organs, tissues, body parts or foreign objects that may be removed from my body during the operation/procedure for diagnosis, research or teaching purposes and their subsequent disposal by hospital authorities. I also authorize the release of specimen test results and/or written reports to my treating physician on the hospital medical staff or other referring or consulting physicians involved in my care, at the discretion of the Pathologist or my treating physician. 6.   I consent to the photographing or videotaping of the operations or procedures to be performed, including appropriate portions of my body for medical, scientific, or educational purposes, provided my identity is not revealed by the pictures or by descriptive texts accompanying them. If the procedure has been photographed/videotaped, the surgeon will obtain the original picture, image, videotape or CD. The hospital will not be responsible for storage, release or maintenance of the picture, image, tape or CD.    7.   I consent to the presence of a  or observers in the operating room as deemed necessary by my physician or their designees.     8.   I recognize that in the event my procedure results in extended X-Ray/fluoroscopy time, I may develop a skin reaction. 9. If I have a Do Not Attempt Resuscitation (DNAR) order in place, that status will be suspended while in the operating room, procedural suite, and during the recovery period unless otherwise explicitly stated by me (or a person authorized to consent on my behalf). The surgeon or my attending physician will determine when the applicable recovery period ends for purposes of reinstating the DNAR order. 10. Patients having a sterilization procedure: I understand that if the procedure is successful the results will be permanent and it will therefore be impossible for me to inseminate, conceive, or bear children. I also understand that the procedure is intended to result in sterility, although the result has not been guaranteed. 11. I acknowledge that my physician has explained sedation/analgesia administration to me including the risk and benefits I consent to the administration of sedation/analgesia as may be necessary or desirable in the judgment of my physician. I CERTIFY THAT I HAVE READ AND FULLY UNDERSTAND THE ABOVE CONSENT TO OPERATION and/or OTHER PROCEDURE.     _________________________________________ _________________________________     ___________________________________  Signature of Patient     Signature of Responsible Person                   Printed Name of Responsible Person                              _________________________________________ ______________________________        ___________________________________  Signature of Witness         Date  Time         Relationship to Patient    STATEMENT OF PHYSICIAN My signature below affirms that prior to the time of the procedure; I have explained to the patient and/or his/her legal representative, the risks and benefits involved in the proposed treatment and any reasonable alternative to the proposed treatment.  I have also explained the risks and benefits involved in refusal of the proposed treatment and alternatives to the proposed treatment and have answered the patient's questions.  If I have a significant financial interest in a co-management agreement or a significant financial interest in any product or implant, or other significant relationship used in this procedure/surgery, I have disclosed this and had a discussion with my patient.     _______________________________________________________________ _____________________________  Edmond Garcia  Physician)                                                                                         (Date)                                   (Time)  Patient Name: Ascencion Bergeron    : 1955   Printed: 3/10/2023      Medical Record #: [id number]                                              Page 1 of 1

## (undated) NOTE — LETTER
Wolfgang Whitehead [address], [place], [place]  [address]  INFORMED CONSENT FOR TRANSFUSION OF BLOOD OR BLOOD PRODUCTS  My physician has informed me of the nature, purpose, benefits and risks of transfusion for blood and blood components that he/she may deem necessary during my treatment or hospitalization. He/she has also discussed alternatives to receiving blood from the voluntary blood supply with me, such as self-donation (autologous) and directed donation (blood donated by family or friends to be used specifically for me). I further understand that while the [address] [place] [address] will attempt to supply any autologous or directed donor blood prior to transfusion of blood from the routine blood supply, medical circumstances may require that other or additional blood components may be required for my care. In giving consent, I acknowledge that my physician has also informed me that despite careful screening and testing in accordance with national and regional regulations, there is still a small risk of transmission of infectious agents including hepatitis, HIV-1/2, cytomegalovirus and other viruses or diseases as yet unknown for which licensed definitive screening tests do not currently exist. Additionally, my physician has informed me of the potential for transfusion reactions not related to an infectious agent. [  ]  Check here for Recurring Outpatient Transfusion Therapy (valid for 1 year) In addition to the above, my physician has informed me that I shall receive numerous transfusions over a period of time and that these can lead to other increased risks. I hereby authorize the transfusion of blood and/or blood products to me as deemed necessary and ordered by physicians participating in my care.  My physician has given me the opportunity to ask questions and any questions asked have been answered to my satisfaction  __________________________________________ ______________________________________________  (Signature of Patient)                                                            (Responsible party in case of Minor,                                                                                                 Incompetent, or unconscious Patient)  ___________________________________________       ________ ______________________________________  (Relationship to Patient)                                                       (Signature of Witness)  ______________________________________________________________________________________________   (Date)                                                                           (Time)  REFUSAL OF CONSENT FOR BLOOD TRANSFUSIONS   Sign only if Refusing   [  ] I understand refusal of blood or blood products as deemed necessary by my physician may have serious consequences to my condition to include possible death.  I hereby assume responsibility for my refusal and release the hospital, its personnel, and my physicians from any responsibility for the consequences of my refusal.    ________________________________________________________________________________  (Signature of Patient)                                                         (Responsible Party/Relationship to Patient)    ________________________________________________________________________________  (Signature of Witness)                                                       (Date/Time)     Patient Name: Doug Miles     : 1955                 Printed: 3/10/2023      Medical Record #: [id number]                                 Page 1 of 1

## (undated) NOTE — IP AVS SNAPSHOT
Patient Demographics     Address  [address] [place] [address]  [place] [address] [id number] Phone  [phone] (70 [phone] [facility]) E-mail Address  [email or web address]      Emergency Contact(s)     Name Relation Home Work [address] Spouse [phone] Next dose due:  12/23/17 morning      Take 1 tablet (100 mg total) by mouth daily.    Bijal Cuevas MD         MetFORMIN HCl  MG Tb24  Commonly known as:  GLUCOPHAGE-XR  Next dose due:  12/23/17 morning      Take 2 tablets (1,000 mg total) by troy [id number] hydrochlorothiazide (MICROZIDE) cap 12.5 mg 12/21/17 1720 Given      [id number] hydrochlorothiazide (MICROZIDE) cap 12.5 mg 12/22/17 1022 Given      [id number] losartan (COZAAR) tab 100 mg 12/22/17 1022 Given            LEFT UPPER ARM     Order ID BASIC METABOLIC PANEL (8) [[id number]] (Abnormal)  Resulted: 12/22/17 0656, Result status: Final result   Ordering provider:  Jenny rDiver MD  12/21/17 4569 Resulting lab:  [facility]    Specimen Information    Type Source Collected On   Blood — 12/2 Dami Baxter[CB. 2] Patient Status:  Observation    1955 MRN [id number]   Location [facility]5W Attending Tita López MD   [facility] Day #[CB.1] 0[CB. 2] PCP[CB.1] Olga Alexander MD[CB. 2]     Date:  2017  Date of Admission: • Other acute pulmonary embolism with acute cor pulmonale ([facility] [address] - on xaralto started 10/9/15 10/9/2015   • Other and unspecified hyperlipidemia    • Pulmonary embolism (HCC)    • S/P hip replacement, left with surgical revision 11/3/2015   • S/P IVC filter SKIN: Denies any unusual skin lesions or rashes   HEENT: Denies visual complaints or deficits; denies nasal congestion, sinus pain, sore throat, hearing loss    RESPIRATORY: Denies shortness of breath, THORNTON, wheezing or cough   CARDIOVASCULAR: Denies chest ALB 4.0 09/08/2017   ALKPHO 73 09/08/2017   BILT 0.72 09/08/2017   TP 7.1 11/03/2017   AST 16 11/03/2017   ALT 19 11/03/2017   PTT 25.2 12/19/2017   INR 1.0 07/17/2015   TSH 0.954 01/26/2017   PSA 0.292 10/17/2016   MG 2.2 01/26/2017   TROP 0.01 12/20/2017 2. No intracranial hemorrhage, mass effect or midline shift. No abnormal extra axial fluid  3. No ventriculomegaly    - neuro consulted  - TTE and carotid doppler ordered  - aspirin 325 and atorvastatin administered[CB. 3]    Electronically signed by Dom Harmon, still having difficulty manipulating his right arm and was unable to stand due to weakness. He denies chest pain and sob. Does note some dizziness and lightheadedness. History[CB. 1]     Past Medical History:   Diagnosis Date   • Acute DVT (deep venous Allergies:[CB.1] No Known Allergies    Prescriptions Prior to Admission:  Ibuprofen (ADVIL) 200 MG Oral Cap Take 600 mg by mouth every 8 (eight) hours as needed.    Acetaminophen-Codeine #3 300-30 MG Oral Tab Take 1 tablet by mouth nightly as needed for Loman Collalfredo SpO2 94%   BMI 35.81 kg/m²[CB. 2]      GENERAL: Well-appearing, no apparent distress   SKIN: no rashes   HEENT: Atraumatic, normocephalic, throat is clear, BRITNEY, EOMI, clear conjunctiva   NECK: No masses, symmetric, no adenopathy   LUNGS: CTAB, no wrr   CV: Type 2 diabetes mellitus without complication (HCC)    Mixed hyperlipidemia    CAD in native artery    Weakness generalized    Dizziness    Anxiety    PAF (paroxysmal atrial fibrillation) ([facility])  Resolved Problems:    * No resolved hospital problems.  *[CB Author:  Patsy Mello PT Service:  Clinical Therapist Author Type:  Physical Therapist    Filed:  12/21/2017 10:47 AM Date of Service:  12/21/2017  9:15 AM Status:  Signed    :  Patsy Mello PT (Physical Therapist)           PHYSICAL THERAPY Emmanuelle Montez Brain stem infarction [facility])  Active Problems:    Type 2 diabetes mellitus without complication ([facility] [address]    Essential hypertension    Mixed hyperlipidemia    CAD in native artery    Chronic left-sided low back pain without sciatica    History of pulmonary em Patient Owned Equipment: None;Rolling walker  Patient Regularly Uses: None    Prior Level of [place]: Patient was independent with functional mobility, ADLs, and driving.  Patient works as a contractor     SUBJECTIVE  \"I don't feel dizzy, I just feel How much help from another person does the patient currently need. ..   -   Moving to and from a bed to a chair (including a wheelchair)?: A Little   -   Need to walk in hospital room?: A Little   -   Climbing 3-5 steps with a railing?: A Little     AM-PAC instructions provided to patient in preparation for discharge.    Goal #5   Current Status    Goal #6    Goal #6  Current Status[MD.1]           Attribution Maria    MD.1 - Sanchez Flores, PT on 12/21/2017 10:41 AM                        Occupational Therapy No sink using LUE support initally on the counter. Pt was able to progress to leaning on the counter and using RUE support on counter to brush his teeth using his left hand.  Pt may benefit from foam to build up toothbrush to initiate using RUE to perform groo -   Eating meals?: A Little[EA. 2]    AM-PAC Score:[EA.1]  Score: 17  Approx Degree of Impairment: 50.11%  Standardized Score (AM-PAC Scale): 37.26  CMS Modifier (G-Code): CK[EA.2]    FUNCTIONAL TRANSFER ASSESSMENT[EA.1]  Supine to Sit : Not tested  Sit to Goals  on: 17  Frequency: 5x/week[EA. 1]       Attribution Maria    EA. 1 - Erika Oneal, OT on 2017 10:40 AM  EA. 2 - Erika Oneal OT on 2017 10:41 AM  EA. 3 - Erika Oneal OT on 2017 10:43 AM               Occupational The OT Device Recommendations: TBD    PLAN  OT Treatment Plan: Balance activities; ADL training;Functional transfer training;UE strengthening/ROM; Patient/Family training;Equipment eval/education         OCCUPATIONAL THERAPY MEDICAL/SOCIAL HISTORY     Problem Asmita Sevilla May 2016: HIP REPLACEMENT SURGERY Left  9/29/15: REVISE TOTAL HIP REPLACEMENT Left      Comment: Dr Wayne Moser  9/1/15: TOTAL HIP REPLACEMENT Left      Comment: Dr Lora Miller  Type of Home: House  Home Layout: Two level (2 [address], 13 stairs to 2nd f -   Bathing (including washing, rinsing, drying)?: None  -   Toileting, which includes using toilet, bedpan or urinal? : None  -   Putting on and taking off regular upper body clothing?: None  -   Taking care of personal grooming such as brushing teeth?: N SLP Note signed by NICO Maxwell at 12/22/2017  9:42 AM  Version 1 of 1    Author:  Maria A Rear, SLP Service:  Rehab Author Type:  Speech and Language Pathologist    Filed:  12/22/2017  9:42 AM Date of Service:  12/22/2017  9:33 AM Statu • LBP radiating to left leg 12/16/2014   • Mixed hyperlipidemia 10/13/2015   • Musculoskeletal strain - left scapular 9/2/2014   • Musculoskeletal strain - left scapular 9/2/2014   • Other acute pulmonary embolism with acute cor pulmonale (HCC) - on xaralt Oral Phase of Swallow (VFSS - Hard Solid):  Within Functional Limits  Triggered at: Base of tongue  Residue Severity, Location: Trace  Laryngeal Penetration: None  Tracheal Aspiration: None  Penetration Aspiration Scale Score: Score 3: Material enters the a FOLLOW UP  Treatment Plan/Recommendations: Dysphagia therapy  Number of Visits to Meet Established Goals: 2    Current status G Code: Initial, Swallowing, CI: 1%-19% impaired, limited, or restricted  Goal status G Code: Swallowing, CH: 0% impaired    Thank

## (undated) NOTE — LETTER
[address]  Authorization for Surgical Operation and Procedure                                                                                           1. I hereby authorize Jose Roberto Mtz MD, my physician and his/her assistants (if applicable), which may include medical students, residents, and/or fellows, to perform the following surgical operation/ procedure and administer such anesthesia as may be determined necessary by my physician: Operation/Procedure name (s) [address] [place]   2. I recognize that during the surgical operation/procedure, unforeseen conditions may necessitate additional or different procedures than those listed above. I, therefore, further authorize and request that the above-named surgeon, assistants, or designees perform such procedures as are, in their judgment, necessary and desirable. 3.   My surgeon/physician has discussed prior to my surgery the potential benefits, risks and side effects of this procedure; the likelihood of achieving goals; and potential problems that might occur during recuperation. They also discussed reasonable alternatives to the procedure, including risks, benefits, and side effects related to the alternatives and risks related to not receiving this procedure. I have had all my questions answered and I acknowledge that no guarantee has been made as to the result that may be obtained. 4.   Should the need arise during my operation/procedure, which includes change of level of care prior to discharge, I also consent to the administration of blood and/or blood products. Further, I understand that despite careful testing and screening of blood or blood products by collecting agencies, I may still be subject to ill effects as a result of receiving a blood transfusion and/or blood products.   The following are some, but not all, of the potential risks that can occur: fever and allergic reactions, hemolytic reactions, transmission of diseases such as Hepatitis, AIDS and Cytomegalovirus (CMV) and fluid overload. In the event that I wish to have an autologous transfusion of my own blood, or a directed donor transfusion, I will discuss this with my physician. Check only if Refusing Blood or Blood Products  I understand refusal of blood or blood products as deemed necessary by my physician may have serious consequences to my condition to include possible death. I hereby assume responsibility for my refusal and release the hospital, its personnel, and my physicians from any responsibility for the consequences of my refusal.    o  Refuse   5. I authorize the use of any specimen, organs, tissues, body parts or foreign objects that may be removed from my body during the operation/procedure for diagnosis, research or teaching purposes and their subsequent disposal by hospital authorities. I also authorize the release of specimen test results and/or written reports to my treating physician on the hospital medical staff or other referring or consulting physicians involved in my care, at the discretion of the Pathologist or my treating physician. 6.   I consent to the photographing or videotaping of the operations or procedures to be performed, including appropriate portions of my body for medical, scientific, or educational purposes, provided my identity is not revealed by the pictures or by descriptive texts accompanying them. If the procedure has been photographed/videotaped, the surgeon will obtain the original picture, image, videotape or CD. The hospital will not be responsible for storage, release or maintenance of the picture, image, tape or CD.    7.   I consent to the presence of a  or observers in the operating room as deemed necessary by my physician or their designees.     8.   I recognize that in the event my procedure results in extended X-Ray/fluoroscopy time, I may develop a skin reaction. 9. If I have a Do Not Attempt Resuscitation (DNAR) order in place, that status will be suspended while in the operating room, procedural suite, and during the recovery period unless otherwise explicitly stated by me (or a person authorized to consent on my behalf). The surgeon or my attending physician will determine when the applicable recovery period ends for purposes of reinstating the DNAR order. 10. Patients having a sterilization procedure: I understand that if the procedure is successful the results will be permanent and it will therefore be impossible for me to inseminate, conceive, or bear children. I also understand that the procedure is intended to result in sterility, although the result has not been guaranteed. 11. I acknowledge that my physician has explained sedation/analgesia administration to me including the risk and benefits I consent to the administration of sedation/analgesia as may be necessary or desirable in the judgment of my physician. I CERTIFY THAT I HAVE READ AND FULLY UNDERSTAND THE ABOVE CONSENT TO OPERATION and/or OTHER PROCEDURE.     _________________________________________ _________________________________     ___________________________________  Signature of Patient     Signature of Responsible Person                   Printed Name of Responsible Person                              _________________________________________ ______________________________        ___________________________________  Signature of Witness         Date  Time         Relationship to Patient    STATEMENT OF PHYSICIAN My signature below affirms that prior to the time of the procedure; I have explained to the patient and/or his/her legal representative, the risks and benefits involved in the proposed treatment and any reasonable alternative to the proposed treatment.  I have also explained the risks and benefits involved in refusal of the proposed treatment and alternatives to the proposed treatment and have answered the patient's questions.  If I have a significant financial interest in a co-management agreement or a significant financial interest in any product or implant, or other significant relationship used in this procedure/surgery, I have disclosed this and had a discussion with my patient.     _______________________________________________________________ _____________________________  Coit Kali of Physician)                                                                                         (Date)                                   (Time)  Patient Name: Opal Ulloa    : 1955   Printed: 3/10/2023      Medical Record #: [id number]                                              Page 1 of 1